# Patient Record
Sex: FEMALE | Race: WHITE | NOT HISPANIC OR LATINO | Employment: OTHER | URBAN - METROPOLITAN AREA
[De-identification: names, ages, dates, MRNs, and addresses within clinical notes are randomized per-mention and may not be internally consistent; named-entity substitution may affect disease eponyms.]

---

## 2018-03-06 ENCOUNTER — CONVERSION ENCOUNTER (OUTPATIENT)
Dept: GENERAL RADIOLOGY | Facility: HOSPITAL | Age: 53
End: 2018-03-06

## 2018-05-10 ENCOUNTER — OFFICE VISIT CONVERTED (OUTPATIENT)
Dept: GASTROENTEROLOGY | Facility: CLINIC | Age: 53
End: 2018-05-10
Attending: INTERNAL MEDICINE

## 2018-07-30 ENCOUNTER — OFFICE VISIT CONVERTED (OUTPATIENT)
Dept: FAMILY MEDICINE CLINIC | Facility: CLINIC | Age: 53
End: 2018-07-30
Attending: FAMILY MEDICINE

## 2018-08-06 ENCOUNTER — CONVERSION ENCOUNTER (OUTPATIENT)
Dept: SURGERY | Facility: CLINIC | Age: 53
End: 2018-08-06

## 2018-08-06 ENCOUNTER — OFFICE VISIT CONVERTED (OUTPATIENT)
Dept: UROLOGY | Facility: CLINIC | Age: 53
End: 2018-08-06
Attending: UROLOGY

## 2019-01-11 ENCOUNTER — OFFICE VISIT CONVERTED (OUTPATIENT)
Dept: FAMILY MEDICINE CLINIC | Facility: CLINIC | Age: 54
End: 2019-01-11
Attending: FAMILY MEDICINE

## 2019-01-11 ENCOUNTER — CONVERSION ENCOUNTER (OUTPATIENT)
Dept: FAMILY MEDICINE CLINIC | Facility: CLINIC | Age: 54
End: 2019-01-11

## 2019-02-01 ENCOUNTER — HOSPITAL ENCOUNTER (OUTPATIENT)
Dept: LAB | Facility: HOSPITAL | Age: 54
Discharge: HOME | End: 2019-02-01
Attending: FAMILY MEDICINE

## 2019-02-01 LAB
25(OH)D3 SERPL-MCNC: 22.8 NG/ML (ref 30–100)
ALBUMIN SERPL-MCNC: 4.1 G/DL (ref 3.5–5)
ALBUMIN/GLOB SERPL: 1.4 {RATIO} (ref 1.4–2.6)
ALP SERPL-CCNC: 100 U/L (ref 53–141)
ALT SERPL-CCNC: 19 U/L (ref 10–40)
ANION GAP SERPL CALC-SCNC: 14 MMOL/L (ref 8–19)
AST SERPL-CCNC: 20 U/L (ref 15–50)
BASOPHILS # BLD AUTO: 0.09 10*3/UL (ref 0–0.2)
BASOPHILS NFR BLD AUTO: 1.39 % (ref 0–3)
BILIRUB SERPL-MCNC: 0.6 MG/DL (ref 0.2–1.3)
BUN SERPL-MCNC: 15 MG/DL (ref 5–25)
BUN/CREAT SERPL: 19 {RATIO} (ref 6–20)
CALCIUM SERPL-MCNC: 9.2 MG/DL (ref 8.7–10.4)
CHLORIDE SERPL-SCNC: 102 MMOL/L (ref 99–111)
CHOLEST SERPL-MCNC: 161 MG/DL (ref 107–200)
CHOLEST/HDLC SERPL: 3.4 {RATIO} (ref 3–6)
CONV CO2: 30 MMOL/L (ref 22–32)
CONV TOTAL PROTEIN: 7.1 G/DL (ref 6.3–8.2)
CREAT UR-MCNC: 0.77 MG/DL (ref 0.5–0.9)
EOSINOPHIL # BLD AUTO: 0.49 10*3/UL (ref 0–0.7)
EOSINOPHIL # BLD AUTO: 7.38 % (ref 0–7)
ERYTHROCYTE [DISTWIDTH] IN BLOOD BY AUTOMATED COUNT: 11.6 % (ref 11.5–14.5)
FOLATE SERPL-MCNC: >20 NG/ML (ref 4.8–20)
GFR SERPLBLD BASED ON 1.73 SQ M-ARVRAT: >60 ML/MIN/{1.73_M2}
GLOBULIN UR ELPH-MCNC: 3 G/DL (ref 2–3.5)
GLUCOSE SERPL-MCNC: 82 MG/DL (ref 65–99)
HBA1C MFR BLD: 13 G/DL (ref 12–16)
HCT VFR BLD AUTO: 38.8 % (ref 37–47)
HDLC SERPL-MCNC: 48 MG/DL (ref 40–60)
LDLC SERPL CALC-MCNC: 92 MG/DL (ref 70–100)
LYMPHOCYTES # BLD AUTO: 2.94 10*3/UL (ref 1–5)
MCH RBC QN AUTO: 31.9 PG (ref 27–31)
MCHC RBC AUTO-ENTMCNC: 33.4 G/DL (ref 33–37)
MCV RBC AUTO: 95.7 FL (ref 81–99)
MONOCYTES # BLD AUTO: 0.63 10*3/UL (ref 0.2–1.2)
MONOCYTES NFR BLD AUTO: 9.55 % (ref 3–10)
NEUTROPHILS # BLD AUTO: 2.43 10*3/UL (ref 2–8)
NEUTROPHILS NFR BLD AUTO: 36.9 % (ref 30–85)
NRBC BLD AUTO-RTO: 0 % (ref 0–0.01)
OSMOLALITY SERPL CALC.SUM OF ELEC: 294 MOSM/KG (ref 273–304)
PLATELET # BLD AUTO: 260 10*3/UL (ref 130–400)
PMV BLD AUTO: 9.4 FL (ref 7.4–10.4)
POTASSIUM SERPL-SCNC: 3.9 MMOL/L (ref 3.5–5.3)
RBC # BLD AUTO: 4.06 10*6/UL (ref 4.2–5.4)
SODIUM SERPL-SCNC: 142 MMOL/L (ref 135–147)
TRIGL SERPL-MCNC: 104 MG/DL (ref 40–150)
TSH SERPL-ACNC: 2.76 M[IU]/L (ref 0.27–4.2)
VARIANT LYMPHS NFR BLD MANUAL: 44.8 % (ref 20–45)
VIT B12 SERPL-MCNC: 1192 PG/ML (ref 211–911)
VLDLC SERPL-MCNC: 21 MG/DL (ref 5–37)
WBC # BLD AUTO: 6.57 10*3/UL (ref 4.8–10.8)

## 2019-02-05 ENCOUNTER — OFFICE VISIT CONVERTED (OUTPATIENT)
Dept: FAMILY MEDICINE CLINIC | Facility: CLINIC | Age: 54
End: 2019-02-05
Attending: FAMILY MEDICINE

## 2019-03-08 ENCOUNTER — OFFICE VISIT CONVERTED (OUTPATIENT)
Dept: FAMILY MEDICINE CLINIC | Facility: CLINIC | Age: 54
End: 2019-03-08
Attending: FAMILY MEDICINE

## 2019-05-07 ENCOUNTER — HOSPITAL ENCOUNTER (OUTPATIENT)
Dept: GENERAL RADIOLOGY | Facility: HOSPITAL | Age: 54
Discharge: HOME | End: 2019-05-07
Attending: OBSTETRICS & GYNECOLOGY

## 2019-05-21 ENCOUNTER — HOSPITAL ENCOUNTER (OUTPATIENT)
Dept: GENERAL RADIOLOGY | Facility: HOSPITAL | Age: 54
Discharge: HOME | End: 2019-05-21
Attending: OBSTETRICS & GYNECOLOGY

## 2019-05-31 ENCOUNTER — HOSPITAL ENCOUNTER (OUTPATIENT)
Dept: MAMMOGRAPHY | Facility: HOSPITAL | Age: 54
Discharge: HOME | End: 2019-05-31
Attending: OBSTETRICS & GYNECOLOGY

## 2019-06-06 ENCOUNTER — HOSPITAL ENCOUNTER (OUTPATIENT)
Dept: ONCOLOGY | Facility: HOSPITAL | Age: 54
Discharge: HOME | End: 2019-06-06
Attending: INTERNAL MEDICINE

## 2019-06-06 ENCOUNTER — OFFICE VISIT CONVERTED (OUTPATIENT)
Dept: RADIATION ONCOLOGY | Facility: HOSPITAL | Age: 54
End: 2019-06-06
Attending: INTERNAL MEDICINE

## 2019-06-06 ENCOUNTER — OFFICE VISIT CONVERTED (OUTPATIENT)
Dept: OTHER | Facility: HOSPITAL | Age: 54
End: 2019-06-06
Attending: SURGERY

## 2019-06-10 ENCOUNTER — HOSPITAL ENCOUNTER (OUTPATIENT)
Dept: MRI IMAGING | Facility: HOSPITAL | Age: 54
Discharge: HOME | End: 2019-06-10
Attending: SURGERY

## 2019-06-12 ENCOUNTER — TELEPHONE (OUTPATIENT)
Dept: MAMMOGRAPHY | Facility: CLINIC | Age: 54
End: 2019-06-12

## 2019-06-26 ENCOUNTER — HOSPITAL ENCOUNTER (OUTPATIENT)
Dept: FAMILY MEDICINE CLINIC | Facility: CLINIC | Age: 54
Discharge: HOME | End: 2019-06-26
Attending: FAMILY MEDICINE

## 2019-06-27 ENCOUNTER — HOSPITAL ENCOUNTER (OUTPATIENT)
Dept: RADIATION ONCOLOGY | Facility: HOSPITAL | Age: 54
Setting detail: RECURRING SERIES
Discharge: HOME | End: 2019-06-30
Attending: RADIOLOGY

## 2019-06-28 ENCOUNTER — OFFICE VISIT (OUTPATIENT)
Dept: MAMMOGRAPHY | Facility: CLINIC | Age: 54
End: 2019-06-28

## 2019-06-28 ENCOUNTER — PREP FOR SURGERY (OUTPATIENT)
Dept: OTHER | Facility: HOSPITAL | Age: 54
End: 2019-06-28

## 2019-06-28 ENCOUNTER — DOCUMENTATION (OUTPATIENT)
Dept: OTHER | Facility: HOSPITAL | Age: 54
End: 2019-06-28

## 2019-06-28 VITALS
HEART RATE: 69 BPM | OXYGEN SATURATION: 97 % | DIASTOLIC BLOOD PRESSURE: 60 MMHG | HEIGHT: 65 IN | SYSTOLIC BLOOD PRESSURE: 108 MMHG | TEMPERATURE: 98.6 F | BODY MASS INDEX: 23.24 KG/M2 | WEIGHT: 139.5 LBS

## 2019-06-28 DIAGNOSIS — D05.11 DUCTAL CARCINOMA IN SITU (DCIS) OF RIGHT BREAST: Primary | ICD-10-CM

## 2019-06-28 LAB — BACTERIA UR CULT: NORMAL

## 2019-06-28 PROCEDURE — 99205 OFFICE O/P NEW HI 60 MIN: CPT | Performed by: SURGERY

## 2019-06-28 RX ORDER — MIRTAZAPINE 15 MG/1
30 TABLET, FILM COATED ORAL NIGHTLY
Refills: 1 | COMMUNITY
Start: 2019-04-06

## 2019-06-28 RX ORDER — ASPIRIN 81 MG/1
81 TABLET ORAL DAILY
COMMUNITY
End: 2019-07-17

## 2019-06-28 RX ORDER — CEFAZOLIN SODIUM 2 G/100ML
2 INJECTION, SOLUTION INTRAVENOUS ONCE
Status: CANCELLED | OUTPATIENT
Start: 2019-07-24 | End: 2019-06-28

## 2019-06-28 RX ORDER — DIAZEPAM 5 MG/1
10 TABLET ORAL ONCE
Status: CANCELLED | OUTPATIENT
Start: 2019-07-24 | End: 2019-06-28

## 2019-06-28 RX ORDER — ACETAMINOPHEN 500 MG
1000 TABLET ORAL ONCE
Status: CANCELLED | OUTPATIENT
Start: 2019-07-24 | End: 2019-06-28

## 2019-06-28 NOTE — PROGRESS NOTES
Chief Complaint: Jemma Inman is a 54 y.o.. female here today for Breast Cancer (Right DCIS)        History of Present Illness:  Patient presents with newly diagnosed DCIS. Right side.  She is a nice 54-year-old white female who had normal mammograms last year.  The mammograms this year suggested some pleomorphic calcifications that were clustered and located in the upper outer quadrant of the right breast.  These persisted on diagnostic imaging and a biopsy was recommended.  I have personally reviewed the imaging studies and agree that the calcifications are pleomorphic and concerning.  The biopsy has returned showing intermediate grade DCIS that is ER positive at 100%, UT positive at 12%, and the Ki-67 is 15%.  Review of the post biopsy films suggest that all the calcifications have been removed.  The patient has undergone an MRI of the breast and it reveals a 1.5 cm area of non-masslike enhancement in the right breast approximately 4 mm medial to the clip.  The enhancement appears to be adjacent to the pectoralis muscle but it does not appear to be invading it.  The patient has had a previous left breast biopsy that was benign.  Her family history is significant for 2 paternal aunts with breast cancer in their 60s.  Her mother had a primary brain cancer.  She took birth control pills for years but has not taken hormone replacement therapy.      Review of Systems:  Review of Systems   Constitutional: Positive for unexpected weight change.        30 lb. Loss unintentional   HENT:   Positive for hearing loss and tinnitus.    Gastrointestinal: Positive for diarrhea.   Endocrine: Positive for hot flashes.   Genitourinary: Positive for hematuria.    Musculoskeletal: Positive for back pain.   Skin: Positive for itching.        The patient denies any noticeable changes to the skin of the breast.    Psychiatric/Behavioral: The patient is nervous/anxious.         Past Medical and Surgical History:  Breast Biopsy  History:  Patient has had the following breast biopsies: Left-benign 2019 Right-malignant  Breast Cancer HIstory:  Patient does not have a past medical history of breast cancer.  Breast Operations, and year:  None  Social History     Tobacco Use   Smoking Status Former Smoker   • Last attempt to quit:    • Years since quittin.4     Obstetric History:  Patient is postmenopausal, entered menopause naturally at age: 46   Number of pregnancies:1  Number of live births: 1  Number of abortions or miscarriages: 0  Age of delivery of first child: 20  Patient did not breast feed.  Length of time taking birth control pills:30 years  Patient has never taken hormone replacement    Past Surgical History:   Procedure Laterality Date   • BREAST BIOPSY       Left-benign 2019 Right-malignant       Past Medical History:   Diagnosis Date   • IBS (irritable bowel syndrome)        Prior Hospitalizations, other than for surgery or childbirth, and year:  None    Social History:  Patient is .  Patient has one daughters.    Family History:  Family History   Problem Relation Age of Onset   • Depression Mother    • Brain cancer Mother    • Prostate cancer Father    • Breast cancer Paternal Aunt    • Prostate cancer Paternal Uncle    • Cancer Paternal Grandfather         eye   • Breast cancer Paternal Aunt    • Colon cancer Paternal Aunt    • Prostate cancer Paternal Uncle    • Throat cancer Paternal Uncle        Vital Signs:  Vitals:    19 1229   BP: 108/60   Pulse: 69   Temp: 98.6 °F (37 °C)   SpO2: 97%       Medications:    Current Outpatient Prescriptions:     Current Outpatient Medications:   •  aspirin 81 MG EC tablet, Take 81 mg by mouth Daily., Disp: , Rfl:   •  mirtazapine (REMERON) 15 MG tablet, Take 15 mg by mouth Daily., Disp: , Rfl: 1    Physical Examination:  General Appearance:   Patient is in no distress.  She is well kept and has an average build.   Psychiatric:  Patient with appropriate mood and  affect. Alert and oriented to self, time, and place.    Breast, RIGHT:  small sized, symmetric with the contralateral side.  Breast skin is without erythema, edema, rashes.  There are no visible abnormalities upon inspection during the arm-raising maneuver or with hands on hips in the sitting position. There is no nipple retraction, discharge or nipple/areolar skin changes.There are no masses palpable in the sitting or supine positions.  There is a small biopsy scar in the lateral aspect of the breast.    Breast, LEFT:  small sized, symmetric with the contralateral side.  Breast skin is without erythema, edema, rashes.  There are no visible abnormalities upon inspection during the arm-raising maneuver or with hands on hips in the sitting position. There is no nipple retraction, discharge or nipple/areolar skin changes.There are no masses palpable in the sitting or supine positions.  There is a small excisional biopsy scar in the 12 o'clock position of the breast.  There is a small amount of architectural distortion associated with this.    Lymphatic:  There is no axillary, cervical, infraclavicular, or supraclavicular adenopathy bilaterally.  Eyes:  Pupils are round and reactive to light.  Cardiovascular:  Heart rate and rhythm are regular.  Respiratory:  Lungs are clear bilaterally with no crackles or wheezes in any lung field.  Gastrointestinal:  Abdomen is soft, nondistended, and nontender.  There was no evidence of hepatosplenomegaly or abdominal mass.  There are no scars from previous surgery.    Musculoskeletal:  Good strength in all 4 extremities.   There is good range of motion in both shoulders.    Skin:  No new skin lesions or rashes on the skin excluding the breast (see breast exam above).    Cancer Staging  Primary Tumor: TIS  Regional Lymph Nodes:  N0  Distant Mets: M0  Clinical Stage Group: 0    Assessment:  1. Ductal carcinoma in situ (DCIS) of right breast          Plan:  She was accompanied today by  her .  The office visit lasted 1 hour and 5 minutes with 50 minutes spent in face-to-face consultation.    We began the conversation discussing her pathology report.  We talked about the origin of most of breast cancers from either the ducts or the lobules.  The difference between invasive and in situ disease was explained and the visual was drawn for her.  When invasion has occurred, the lymph nodes need to be evaluated.  When there is in situ disease the lymph nodes should be considered if the area of concern is widespread or it is high-grade.  We also discussed the significance of hormone receptors.  They often can give us some idea how the breast cancer will behave and potentially lead us to offer neoadjuvant chemotherapy.    Next we discussed the surgical options for DCIS which include breast conserving therapy versus a mastectomy.  With breast conserving therapy we are talking about a lumpectomy with margins and radiation treatment.  The radiation treatment is generally given to the entire breast for 6 weeks although they are doing some shorter courses nowadays..  The side effects consist of local skin change and potential injury to nearby structures such as the lung or the heart.  A mastectomy would involve removing the breast tissues with preservation of the pectoral muscles and evaluation of the lymph nodes if indicated.  The potential for reconstruction by either an implant or autologous tissue was discussed.  This could be performed on a delayed basis or immediately depending on a multitude of factors.  The survival rates for these 2 procedures are equivalent but there are incidences where one may be favored over the other.  There is also slight increased risk of local recurrence following lumpectomy than a mastectomy.  There are times when a lumpectomy is not possible due to the large tumor to breast ratio or the location of the tumor.  Previous radiation to the chest wall or collagen disorders  such as scleroderma would make radiation treatment and possible and therefore exclude breast conserving therapy as an option.    The patient prefers breast conserving surgery.  She understands that needle localization will be required.  She is also aware of the 15 to 20% risk of a positive margin requiring a return to surgery.  It is also possible that invasive cancer could be found requiring us to go back and perform a sentinel lymph node biopsy.  All of her questions have been answered and she wishes to proceed.    CPT coding:    Next Appointment:  No Follow-up on file.            EMR Dragon/transcription disclaimer:    Much of this encounter note is an electronic transcription/translocation of spoken language to printed text.  The electronic translation of spoken language may permit erroneous, or at times, nonsensical words or phrases to be inadvertently transcribed.  Although I have reviewed the note from such areas, some may still exist.

## 2019-06-28 NOTE — PROGRESS NOTES
Referral received from Dr. Myers's office. Met Jemma during her surgery consult. I introduced myself and navigational services. After the consult she is leaning toward having a lumpectomy. She is comfortable with this plan and has no questions or concerns following the consult. She has a good understanding of her pathology and treatment options presented to her by Dr. Myers.     We discussed integrative therapies and other services at [facility] including the opportunity to speak with our Oncology Dietitian or Oncology Social Worker. I gave her a navigation folder with the following information:    [facility] Cancer Support Network, Sharing Our Stories Breast Cancer Support Group, Cancer and Restorative Exercise (CARE), [facility] Exercise program, Together for Breast Cancer Survival, For Women Facing Breast Cancer, Road to Recovery, Bioimpedeance, [facility], Massage Therapy, Reiki Therapy, [employer] [place], Cancer Nutrition, Survivorship Clinic, Cancer and Career.     She verbalized appreciation for navigational services and she has my contact information and will call with any questions that arise.

## 2019-07-01 ENCOUNTER — TELEPHONE (OUTPATIENT)
Dept: MAMMOGRAPHY | Facility: CLINIC | Age: 54
End: 2019-07-01

## 2019-07-01 NOTE — TELEPHONE ENCOUNTER
The patient has fairly intermediate to low-grade DCIS.  She prefers to have her breast conserving surgery done in mid August and then go on her trip and come back to do the additional treatment.  I think that is reasonable to do and we will schedule her surgery around that.

## 2019-07-03 ENCOUNTER — TRANSCRIBE ORDERS (OUTPATIENT)
Dept: ADMINISTRATIVE | Facility: HOSPITAL | Age: 54
End: 2019-07-03

## 2019-07-03 DIAGNOSIS — C50.919 MALIGNANT NEOPLASM OF FEMALE BREAST, UNSPECIFIED ESTROGEN RECEPTOR STATUS, UNSPECIFIED LATERALITY, UNSPECIFIED SITE OF BREAST (HCC): Primary | ICD-10-CM

## 2019-07-03 PROBLEM — D05.11 DUCTAL CARCINOMA IN SITU (DCIS) OF RIGHT BREAST: Status: ACTIVE | Noted: 2019-07-03

## 2019-07-15 DIAGNOSIS — D05.11 DUCTAL CARCINOMA IN SITU (DCIS) OF RIGHT BREAST: Primary | ICD-10-CM

## 2019-07-17 ENCOUNTER — APPOINTMENT (OUTPATIENT)
Dept: PREADMISSION TESTING | Facility: HOSPITAL | Age: 54
End: 2019-07-17

## 2019-07-17 VITALS
RESPIRATION RATE: 18 BRPM | DIASTOLIC BLOOD PRESSURE: 62 MMHG | BODY MASS INDEX: 22.99 KG/M2 | HEIGHT: 65 IN | WEIGHT: 138 LBS | TEMPERATURE: 97 F | OXYGEN SATURATION: 96 % | SYSTOLIC BLOOD PRESSURE: 116 MMHG | HEART RATE: 81 BPM

## 2019-07-17 LAB
ANION GAP SERPL CALCULATED.3IONS-SCNC: 9.3 MMOL/L (ref 5–15)
BUN BLD-MCNC: 16 MG/DL (ref 6–20)
BUN/CREAT SERPL: 20.3 (ref 7–25)
CALCIUM SPEC-SCNC: 9.1 MG/DL (ref 8.6–10.5)
CHLORIDE SERPL-SCNC: 104 MMOL/L (ref 98–107)
CO2 SERPL-SCNC: 27.7 MMOL/L (ref 22–29)
CREAT BLD-MCNC: 0.79 MG/DL (ref 0.57–1)
DEPRECATED RDW RBC AUTO: 44.7 FL (ref 37–54)
ERYTHROCYTE [DISTWIDTH] IN BLOOD BY AUTOMATED COUNT: 12.5 % (ref 12.3–15.4)
GFR SERPL CREATININE-BSD FRML MDRD: 75 ML/MIN/1.73
GLUCOSE BLD-MCNC: 109 MG/DL (ref 65–99)
HCT VFR BLD AUTO: 37.1 % (ref 34–46.6)
HGB BLD-MCNC: 11.8 G/DL (ref 12–15.9)
MCH RBC QN AUTO: 31 PG (ref 26.6–33)
MCHC RBC AUTO-ENTMCNC: 31.8 G/DL (ref 31.5–35.7)
MCV RBC AUTO: 97.4 FL (ref 79–97)
PLATELET # BLD AUTO: 217 10*3/MM3 (ref 140–450)
PMV BLD AUTO: 11.4 FL (ref 6–12)
POTASSIUM BLD-SCNC: 4.2 MMOL/L (ref 3.5–5.2)
RBC # BLD AUTO: 3.81 10*6/MM3 (ref 3.77–5.28)
SODIUM BLD-SCNC: 141 MMOL/L (ref 136–145)
WBC NRBC COR # BLD: 6.34 10*3/MM3 (ref 3.4–10.8)

## 2019-07-17 PROCEDURE — 93010 ELECTROCARDIOGRAM REPORT: CPT | Performed by: INTERNAL MEDICINE

## 2019-07-17 PROCEDURE — 80048 BASIC METABOLIC PNL TOTAL CA: CPT | Performed by: SURGERY

## 2019-07-17 PROCEDURE — 93005 ELECTROCARDIOGRAM TRACING: CPT

## 2019-07-17 PROCEDURE — 85027 COMPLETE CBC AUTOMATED: CPT | Performed by: SURGERY

## 2019-07-17 PROCEDURE — 36415 COLL VENOUS BLD VENIPUNCTURE: CPT

## 2019-07-17 NOTE — DISCHARGE INSTRUCTIONS
Take the following medications the morning of surgery with a small sip of water: NONE    ARRIVE AT 7AM    General Instructions:  • Do not eat solid food after midnight the night before surgery.  • You may drink clear liquids day of surgery but must stop at least one hour before your hospital arrival time.  • It is beneficial for you to have a clear drink that contains carbohydrates the day of surgery.  We suggest a 12 to 20 ounce bottle of Gatorade or Powerade for non-diabetic patients or a 12 to 20 ounce bottle of G2 or Powerade Zero for diabetic patients. (Pediatric patients, are not advised to drink a 12 to 20 ounce carbohydrate drink)    Clear liquids are liquids you can see through.  Nothing red in color.     Plain water                               Sports drinks  Sodas                                   Gelatin (Jell-O)  Fruit juices without pulp such as white grape juice and apple juice  Popsicles that contain no fruit or yogurt  Tea or coffee (no cream or milk added)  Gatorade / Powerade  G2 / Powerade Zero    • Infants may have breast milk up to four hours before surgery.  • Infants drinking formula may drink formula up to six hours before surgery.   • Patients who avoid smoking, chewing tobacco and alcohol for 4 weeks prior to surgery have a reduced risk of post-operative complications.  Quit smoking as many days before surgery as you can.  • Do not smoke, use chewing tobacco or drink alcohol the day of surgery.   • If applicable bring your C-PAP/ BI-PAP machine.  • Bring any papers given to you in the doctor’s office.  • Wear clean comfortable clothes and socks.  • Do not wear contact lenses, false eyelashes or make-up.  Bring a case for your glasses.   • Bring crutches or walker if applicable.  • Remove all piercings.  Leave jewelry and any other valuables at home.  • Hair extensions with metal clips must be removed prior to surgery.  • The Pre-Admission Testing nurse will instruct you to bring  medications if unable to obtain an accurate list in Pre-Admission Testing.            Preventing a Surgical Site Infection:  • For 2 to 3 days before surgery, avoid shaving with a razor because the razor can irritate skin and make it easier to develop an infection.    • Any areas of open skin can increase the risk of a post-operative wound infection by allowing bacteria to enter and travel throughout the body.  Notify your surgeon if you have any skin wounds / rashes even if it is not near the expected surgical site.  The area will need assessed to determine if surgery should be delayed until it is healed.  • The night prior to surgery sleep in a clean bed with clean clothing.  Do not allow pets to sleep with you.  • Shower on the morning of surgery using a fresh bar of anti-bacterial soap (such as Dial) and clean washcloth.  Dry with a clean towel and dress in clean clothing.  • Ask your surgeon if you will be receiving antibiotics prior to surgery.  • Make sure you, your family, and all healthcare providers clean their hands with soap and water or an alcohol based hand  before caring for you or your wound.    Day of surgery:  Upon arrival, a Pre-op nurse and Anesthesiologist will review your health history, obtain vital signs, and answer questions you may have.  The only belongings needed at this time will be a list of your home medications and if applicable your C-PAP/BI-PAP machine.  If you are staying overnight your family can leave the rest of your belongings in the car and bring them to your room later.  A Pre-op nurse will start an IV and you may receive medication in preparation for surgery, including something to help you relax.  Your family will be able to see you in the Pre-op area.  While you are in surgery your family should notify the waiting room  if they leave the waiting room area and provide a contact phone number.    Please be aware that surgery does come with discomfort.  We  want to make every effort to control your discomfort so please discuss any uncontrolled symptoms with your nurse.   Your doctor will most likely have prescribed pain medications.      If you are going home after surgery you will receive individualized written care instructions before being discharged.  A responsible adult must drive you to and from the hospital on the day of your surgery and stay with you for 24 hours.    If you are staying overnight following surgery, you will be transported to your hospital room following the recovery period.  [facility] has all private rooms.    You have received a list of surgical assistants for your reference.  If you have any questions please call Pre-Admission Testing at [phone].  Deductibles and co-payments are collected on the day of service. Please be prepared to pay the required co-pay, deductible or deposit on the day of service as defined by your plan.  2% CHLORAHEXIDINE GLUCONATE* CLOTH  Preparing or “prepping” skin before surgery can reduce the risk of infection at the surgical site. To make the process easier, [facility] has chosen disposable cloths moistened with a rinse-free, 2% Chlorhexidine Gluconate (CHG) antiseptic solution. The steps below outline the prepping process and should be carefully followed.        Use the prep cloth on the area that is circled in the diagram             Directions Night before Surgery  1) Shower using a fresh bar of anti-bacterial soap (such as Dial) and clean washcloth.  Use a clean towel to completely dry your skin.  2) Do not use any lotions, oils or creams on your skin.  3) Open the package and remove 1 cloth, wipe your skin for 30 seconds in a circular motion.  Allow to dry for 3 minutes.  4) Repeat #3 with second cloth.  5) Do not touch your eyes, ears, or mouth with the prep cloth.  6) Allow the wet prep solution to air dry.  7) Discard the prep cloth and wash your hands with soap and water.    8) Dress in clean bed clothes and sleep on fresh clean bed sheets.   9) You may experience some temporary itching after the prep.    Directions Day of Surgery  1) Repeat steps 1,2,3,4,5,6,7, and 9.   2) Dress in clean clothes before coming to the hospital.

## 2019-07-24 ENCOUNTER — HOSPITAL ENCOUNTER (OUTPATIENT)
Facility: HOSPITAL | Age: 54
Setting detail: HOSPITAL OUTPATIENT SURGERY
Discharge: HOME | End: 2019-07-24
Attending: SURGERY | Admitting: SURGERY

## 2019-07-24 ENCOUNTER — ANESTHESIA (OUTPATIENT)
Dept: PERIOP | Facility: HOSPITAL | Age: 54
End: 2019-07-24

## 2019-07-24 ENCOUNTER — ANESTHESIA EVENT (OUTPATIENT)
Dept: PERIOP | Facility: HOSPITAL | Age: 54
End: 2019-07-24

## 2019-07-24 ENCOUNTER — APPOINTMENT (OUTPATIENT)
Dept: GENERAL RADIOLOGY | Facility: HOSPITAL | Age: 54
End: 2019-07-24

## 2019-07-24 ENCOUNTER — HOSPITAL ENCOUNTER (OUTPATIENT)
Dept: MAMMOGRAPHY | Facility: HOSPITAL | Age: 54
Discharge: HOME | End: 2019-07-24

## 2019-07-24 VITALS
BODY MASS INDEX: 22.71 KG/M2 | SYSTOLIC BLOOD PRESSURE: 117 MMHG | DIASTOLIC BLOOD PRESSURE: 76 MMHG | TEMPERATURE: 97.9 F | HEART RATE: 74 BPM | RESPIRATION RATE: 16 BRPM | WEIGHT: 136.3 LBS | HEIGHT: 65 IN | OXYGEN SATURATION: 100 %

## 2019-07-24 DIAGNOSIS — D05.11 DUCTAL CARCINOMA IN SITU (DCIS) OF RIGHT BREAST: ICD-10-CM

## 2019-07-24 DIAGNOSIS — C50.919 MALIGNANT NEOPLASM OF FEMALE BREAST, UNSPECIFIED ESTROGEN RECEPTOR STATUS, UNSPECIFIED LATERALITY, UNSPECIFIED SITE OF BREAST (HCC): ICD-10-CM

## 2019-07-24 PROCEDURE — 25010000003 CEFAZOLIN IN DEXTROSE 2-4 GM/100ML-% SOLUTION: Performed by: SURGERY

## 2019-07-24 PROCEDURE — 25010000002 PROPOFOL 10 MG/ML EMULSION: Performed by: NURSE ANESTHETIST, CERTIFIED REGISTERED

## 2019-07-24 PROCEDURE — 25010000003 LIDOCAINE 1 % SOLUTION: Performed by: SURGERY

## 2019-07-24 PROCEDURE — 88341 IMHCHEM/IMCYTCHM EA ADD ANTB: CPT | Performed by: SURGERY

## 2019-07-24 PROCEDURE — 88360 TUMOR IMMUNOHISTOCHEM/MANUAL: CPT | Performed by: SURGERY

## 2019-07-24 PROCEDURE — 19301 PARTIAL MASTECTOMY: CPT | Performed by: SURGERY

## 2019-07-24 PROCEDURE — 25010000002 FENTANYL CITRATE (PF) 100 MCG/2ML SOLUTION: Performed by: NURSE ANESTHETIST, CERTIFIED REGISTERED

## 2019-07-24 PROCEDURE — 25010000002 ONDANSETRON PER 1 MG: Performed by: NURSE ANESTHETIST, CERTIFIED REGISTERED

## 2019-07-24 PROCEDURE — 88307 TISSUE EXAM BY PATHOLOGIST: CPT | Performed by: SURGERY

## 2019-07-24 PROCEDURE — 88342 IMHCHEM/IMCYTCHM 1ST ANTB: CPT | Performed by: SURGERY

## 2019-07-24 PROCEDURE — 76098 X-RAY EXAM SURGICAL SPECIMEN: CPT

## 2019-07-24 PROCEDURE — 25010000002 DEXAMETHASONE PER 1 MG: Performed by: NURSE ANESTHETIST, CERTIFIED REGISTERED

## 2019-07-24 RX ORDER — LABETALOL HYDROCHLORIDE 5 MG/ML
5 INJECTION, SOLUTION INTRAVENOUS
Status: DISCONTINUED | OUTPATIENT
Start: 2019-07-24 | End: 2019-07-24 | Stop reason: HOSPADM

## 2019-07-24 RX ORDER — ASPIRIN 81 MG/1
81 TABLET, CHEWABLE ORAL DAILY
COMMUNITY

## 2019-07-24 RX ORDER — ACETAMINOPHEN 325 MG/1
650 TABLET ORAL ONCE AS NEEDED
Status: DISCONTINUED | OUTPATIENT
Start: 2019-07-24 | End: 2019-07-24 | Stop reason: HOSPADM

## 2019-07-24 RX ORDER — PROMETHAZINE HYDROCHLORIDE 25 MG/ML
6.25 INJECTION, SOLUTION INTRAMUSCULAR; INTRAVENOUS
Status: DISCONTINUED | OUTPATIENT
Start: 2019-07-24 | End: 2019-07-24 | Stop reason: HOSPADM

## 2019-07-24 RX ORDER — FENTANYL CITRATE 50 UG/ML
50 INJECTION, SOLUTION INTRAMUSCULAR; INTRAVENOUS
Status: DISCONTINUED | OUTPATIENT
Start: 2019-07-24 | End: 2019-07-24 | Stop reason: HOSPADM

## 2019-07-24 RX ORDER — SODIUM CHLORIDE 0.9 % (FLUSH) 0.9 %
1-10 SYRINGE (ML) INJECTION AS NEEDED
Status: DISCONTINUED | OUTPATIENT
Start: 2019-07-24 | End: 2019-07-24 | Stop reason: HOSPADM

## 2019-07-24 RX ORDER — ONDANSETRON 2 MG/ML
4 INJECTION INTRAMUSCULAR; INTRAVENOUS ONCE AS NEEDED
Status: DISCONTINUED | OUTPATIENT
Start: 2019-07-24 | End: 2019-07-24 | Stop reason: HOSPADM

## 2019-07-24 RX ORDER — LIDOCAINE HYDROCHLORIDE 20 MG/ML
INJECTION, SOLUTION INFILTRATION; PERINEURAL AS NEEDED
Status: DISCONTINUED | OUTPATIENT
Start: 2019-07-24 | End: 2019-07-24 | Stop reason: SURG

## 2019-07-24 RX ORDER — HYDRALAZINE HYDROCHLORIDE 20 MG/ML
5 INJECTION INTRAMUSCULAR; INTRAVENOUS
Status: DISCONTINUED | OUTPATIENT
Start: 2019-07-24 | End: 2019-07-24 | Stop reason: HOSPADM

## 2019-07-24 RX ORDER — DEXAMETHASONE SODIUM PHOSPHATE 10 MG/ML
INJECTION INTRAMUSCULAR; INTRAVENOUS AS NEEDED
Status: DISCONTINUED | OUTPATIENT
Start: 2019-07-24 | End: 2019-07-24 | Stop reason: SURG

## 2019-07-24 RX ORDER — PROMETHAZINE HYDROCHLORIDE 25 MG/1
25 SUPPOSITORY RECTAL ONCE AS NEEDED
Status: DISCONTINUED | OUTPATIENT
Start: 2019-07-24 | End: 2019-07-24 | Stop reason: HOSPADM

## 2019-07-24 RX ORDER — HYDROCODONE BITARTRATE AND ACETAMINOPHEN 7.5; 325 MG/1; MG/1
1 TABLET ORAL ONCE AS NEEDED
Status: DISCONTINUED | OUTPATIENT
Start: 2019-07-24 | End: 2019-07-24 | Stop reason: HOSPADM

## 2019-07-24 RX ORDER — CEFAZOLIN SODIUM 2 G/100ML
2 INJECTION, SOLUTION INTRAVENOUS ONCE
Status: COMPLETED | OUTPATIENT
Start: 2019-07-24 | End: 2019-07-24

## 2019-07-24 RX ORDER — FLUMAZENIL 0.1 MG/ML
0.2 INJECTION INTRAVENOUS AS NEEDED
Status: DISCONTINUED | OUTPATIENT
Start: 2019-07-24 | End: 2019-07-24 | Stop reason: HOSPADM

## 2019-07-24 RX ORDER — FENTANYL CITRATE 50 UG/ML
INJECTION, SOLUTION INTRAMUSCULAR; INTRAVENOUS AS NEEDED
Status: DISCONTINUED | OUTPATIENT
Start: 2019-07-24 | End: 2019-07-24 | Stop reason: SURG

## 2019-07-24 RX ORDER — HYDROMORPHONE HYDROCHLORIDE 1 MG/ML
0.5 INJECTION, SOLUTION INTRAMUSCULAR; INTRAVENOUS; SUBCUTANEOUS
Status: DISCONTINUED | OUTPATIENT
Start: 2019-07-24 | End: 2019-07-24 | Stop reason: HOSPADM

## 2019-07-24 RX ORDER — MIDAZOLAM HYDROCHLORIDE 1 MG/ML
1 INJECTION INTRAMUSCULAR; INTRAVENOUS
Status: DISCONTINUED | OUTPATIENT
Start: 2019-07-24 | End: 2019-07-24 | Stop reason: HOSPADM

## 2019-07-24 RX ORDER — LIDOCAINE HYDROCHLORIDE 10 MG/ML
3 INJECTION, SOLUTION INFILTRATION; PERINEURAL ONCE
Status: COMPLETED | OUTPATIENT
Start: 2019-07-24 | End: 2019-07-24

## 2019-07-24 RX ORDER — ACETAMINOPHEN 500 MG
1000 TABLET ORAL ONCE
Status: COMPLETED | OUTPATIENT
Start: 2019-07-24 | End: 2019-07-24

## 2019-07-24 RX ORDER — PROPOFOL 10 MG/ML
VIAL (ML) INTRAVENOUS AS NEEDED
Status: DISCONTINUED | OUTPATIENT
Start: 2019-07-24 | End: 2019-07-24 | Stop reason: SURG

## 2019-07-24 RX ORDER — HYDROCODONE BITARTRATE AND ACETAMINOPHEN 5; 325 MG/1; MG/1
1-2 TABLET ORAL EVERY 4 HOURS PRN
Qty: 10 TABLET | Refills: 0 | Status: SHIPPED | OUTPATIENT
Start: 2019-07-24 | End: 2019-08-09

## 2019-07-24 RX ORDER — DIAZEPAM 5 MG/1
10 TABLET ORAL ONCE
Status: COMPLETED | OUTPATIENT
Start: 2019-07-24 | End: 2019-07-24

## 2019-07-24 RX ORDER — PROMETHAZINE HYDROCHLORIDE 25 MG/ML
12.5 INJECTION, SOLUTION INTRAMUSCULAR; INTRAVENOUS ONCE AS NEEDED
Status: DISCONTINUED | OUTPATIENT
Start: 2019-07-24 | End: 2019-07-24 | Stop reason: HOSPADM

## 2019-07-24 RX ORDER — ONDANSETRON 2 MG/ML
INJECTION INTRAMUSCULAR; INTRAVENOUS AS NEEDED
Status: DISCONTINUED | OUTPATIENT
Start: 2019-07-24 | End: 2019-07-24 | Stop reason: SURG

## 2019-07-24 RX ORDER — MAGNESIUM HYDROXIDE 1200 MG/15ML
LIQUID ORAL AS NEEDED
Status: DISCONTINUED | OUTPATIENT
Start: 2019-07-24 | End: 2019-07-24 | Stop reason: HOSPADM

## 2019-07-24 RX ORDER — NALOXONE HCL 0.4 MG/ML
0.2 VIAL (ML) INJECTION AS NEEDED
Status: DISCONTINUED | OUTPATIENT
Start: 2019-07-24 | End: 2019-07-24 | Stop reason: HOSPADM

## 2019-07-24 RX ORDER — LIDOCAINE HYDROCHLORIDE 10 MG/ML
0.5 INJECTION, SOLUTION EPIDURAL; INFILTRATION; INTRACAUDAL; PERINEURAL ONCE AS NEEDED
Status: DISCONTINUED | OUTPATIENT
Start: 2019-07-24 | End: 2019-07-24 | Stop reason: HOSPADM

## 2019-07-24 RX ORDER — MIDAZOLAM HYDROCHLORIDE 1 MG/ML
2 INJECTION INTRAMUSCULAR; INTRAVENOUS
Status: DISCONTINUED | OUTPATIENT
Start: 2019-07-24 | End: 2019-07-24 | Stop reason: HOSPADM

## 2019-07-24 RX ORDER — PROMETHAZINE HYDROCHLORIDE 25 MG/1
25 TABLET ORAL ONCE AS NEEDED
Status: DISCONTINUED | OUTPATIENT
Start: 2019-07-24 | End: 2019-07-24 | Stop reason: HOSPADM

## 2019-07-24 RX ORDER — BUPIVACAINE HYDROCHLORIDE 2.5 MG/ML
INJECTION, SOLUTION INFILTRATION; PERINEURAL AS NEEDED
Status: DISCONTINUED | OUTPATIENT
Start: 2019-07-24 | End: 2019-07-24 | Stop reason: HOSPADM

## 2019-07-24 RX ORDER — FAMOTIDINE 10 MG/ML
20 INJECTION, SOLUTION INTRAVENOUS ONCE
Status: COMPLETED | OUTPATIENT
Start: 2019-07-24 | End: 2019-07-24

## 2019-07-24 RX ORDER — SODIUM CHLORIDE, SODIUM LACTATE, POTASSIUM CHLORIDE, CALCIUM CHLORIDE 600; 310; 30; 20 MG/100ML; MG/100ML; MG/100ML; MG/100ML
9 INJECTION, SOLUTION INTRAVENOUS CONTINUOUS
Status: DISCONTINUED | OUTPATIENT
Start: 2019-07-24 | End: 2019-07-24 | Stop reason: HOSPADM

## 2019-07-24 RX ADMIN — DIAZEPAM 10 MG: 5 TABLET ORAL at 08:01

## 2019-07-24 RX ADMIN — ACETAMINOPHEN 1000 MG: 500 TABLET, FILM COATED ORAL at 08:01

## 2019-07-24 RX ADMIN — FAMOTIDINE 20 MG: 10 INJECTION INTRAVENOUS at 08:34

## 2019-07-24 RX ADMIN — FENTANYL CITRATE 50 MCG: 50 INJECTION INTRAMUSCULAR; INTRAVENOUS at 11:55

## 2019-07-24 RX ADMIN — ONDANSETRON 4 MG: 2 INJECTION INTRAMUSCULAR; INTRAVENOUS at 12:50

## 2019-07-24 RX ADMIN — LIDOCAINE HYDROCHLORIDE 3 ML: 10 INJECTION, SOLUTION INFILTRATION; PERINEURAL at 10:00

## 2019-07-24 RX ADMIN — PROPOFOL 200 MG: 10 INJECTION, EMULSION INTRAVENOUS at 11:55

## 2019-07-24 RX ADMIN — LIDOCAINE HYDROCHLORIDE 100 MG: 20 INJECTION, SOLUTION INFILTRATION; PERINEURAL at 11:55

## 2019-07-24 RX ADMIN — DEXAMETHASONE SODIUM PHOSPHATE 8 MG: 10 INJECTION INTRAMUSCULAR; INTRAVENOUS at 12:04

## 2019-07-24 RX ADMIN — SODIUM CHLORIDE, POTASSIUM CHLORIDE, SODIUM LACTATE AND CALCIUM CHLORIDE 9 ML/HR: 600; 310; 30; 20 INJECTION, SOLUTION INTRAVENOUS at 08:03

## 2019-07-24 RX ADMIN — CEFAZOLIN SODIUM 2 G: 2 INJECTION, SOLUTION INTRAVENOUS at 12:00

## 2019-07-24 NOTE — OP NOTE
Needle Localization Breast lumpectomy for DCIS Procedure Note    Name: Jemma Inman  Age: 54 y.o.  Sex: female  :  1965  MRN: [id number]      Pre-operative Diagnosis:rightDCIS    Post-operative Diagnosis: Same    Procedure:right Needle Localization Breast lumpectomy    Surgeon: Ervin Myers MD.,  FACS    Assistants: None    Anesthesia: General    Indications: This patient recently was diagnosed with right DCIS after presenting with an abnormal mammogram.      Procedure Details   The patient was seen in the Holding Room. The risks, benefits, complications, treatment options, and expected outcomes were discussed with the patient. The possibilities of reaction to medication, pulmonary aspiration, bleeding, infection, the need for additional procedures, failure to diagnose a condition, and creating a complication requiring transfusion or operation were discussed with the patient. The patient concurred with the proposed plan, giving informed consent.  The site of surgery properly noted/marked.    The patient underwent preoperative guidewire localization of a mammographic abnormality in the lower outer aspect of the right breast.       The patient was then taken to Operating Room; identified patient as Jemma Inman  and the procedure verified as rightNeedle Localization  Breast lumpectomy  A Time Out was held and the above information confirmed.       The breast was prepped and draped in standard fashion. Marcaine  0.50% with epinephrine was used to anesthetize the skin at the site of the guidewire placement.   A curvilinear incision was created on the breast near the localization site.  Dissection was carried down to the localized area which was completely excised.  A specimen radiograph confirmed inclusion of the preoperatively identified mammographic lesion/ clip.  Additional shave margins were obtained from areas thought to be close.  This included all 6 margins.. Hemostasis was achieved with  cautery.  Closure was performed  with interrupted 3-0 Vicryl sutures for the deeper layers and a 4-0 Vicryl subcuticular closure.      Steri-Strips were applied. At the end of the operation, all sponge, instrument, and needle counts were correct.    Findings:  There were no unexpected findings.  Estimated Blood Loss:  Minimal           Drains: none          Specimens:   ID Type Source Tests Collected by Time   A : Right breast lumpectomy; long stitch marks lateral, short stitch marks superior, double stitch marks anterior Tissue Breast, Right TISSUE PATHOLOGY EXAM Ervin Myers MD 7/24/2019 1223   B : Additional lateral margin; stitch marks new margin Tissue Breast, Right TISSUE PATHOLOGY EXAM Ervin Myers MD 7/24/2019 1227   C : Additional posterior margin; stitch marks new margin Tissue Breast, Right TISSUE PATHOLOGY EXAM Ervin Myers MD 7/24/2019 1230   D : Additional inferior margin; stitch marks new margin Tissue Breast, Right TISSUE PATHOLOGY EXAM Ervin Myers MD 7/24/2019 1231   E : Additional medial margin; stitch marks new margin Tissue Breast, Right TISSUE PATHOLOGY EXAM Ervin Myers MD 7/24/2019 1232   F : Additional superior margin; stitch marks new margin Tissue Breast, Right TISSUE PATHOLOGY EXAM Ervin Myers MD 7/24/2019 1235   G : Additional anterior margin; stitch marks new margin Tissue Breast, Right TISSUE PATHOLOGY EXAM Ervin Myers MD 7/24/2019 1237       Complications:  None; patient tolerated the procedure well.           Condition: stable

## 2019-07-24 NOTE — ANESTHESIA PROCEDURE NOTES
Airway  Urgency: elective    Airway not difficult    General Information and Staff    Patient location during procedure: OR  Anesthesiologist: Yves Marti MD  CRNA: Reyna Simmons CRNA    Indications and Patient Condition  Indications for airway management: airway protection    Preoxygenated: yes (Pt pre-O2 with 100% O2)  Mask difficulty assessment: 0 - not attempted    Final Airway Details  Final airway type: supraglottic airway      Successful airway: classic  Size 4    Number of attempts at approach: 1    Additional Comments  ATOLMA x1. No change in dentition. + ETCO2. Airway seal pressure <20cm H2O.

## 2019-07-24 NOTE — ANESTHESIA PREPROCEDURE EVALUATION
Anesthesia Evaluation     Patient summary reviewed and Nursing notes reviewed   history of anesthetic complications: PONV  NPO Solid Status: > 8 hours  NPO Liquid Status: > 2 hours           Airway   Mallampati: II  TM distance: >3 FB  Neck ROM: full  Anterior and Possible difficult intubation  Dental          Pulmonary - normal exam   Cardiovascular - normal exam  Exercise tolerance: excellent (>7 METS)    ECG reviewed    (-) angina, PND, GARAY      Neuro/Psych  (+) psychiatric history Anxiety,     GI/Hepatic/Renal/Endo    (-) GERD    Musculoskeletal     Abdominal  - normal exam   Substance History      OB/GYN          Other      history of cancer    ROS/Med Hx Other: Highly anxious  Pt refused scop patch      Phys Exam Other: Prominent incisors              Anesthesia Plan    ASA 2     general   (PONV prophylaxis)  intravenous induction   Anesthetic plan, all risks, benefits, and alternatives have been provided, discussed and informed consent has been obtained with: patient.

## 2019-07-24 NOTE — ANESTHESIA POSTPROCEDURE EVALUATION
"Patient: Jemma Inman    Procedure Summary     Date:  07/24/19 Room / Location:  [facility] OR  / [facility] MAIN OR    Anesthesia Start:  1152 Anesthesia Stop:  1309    Procedure:  RIGHT BREAST LUMPECTOMY WITH NEEDLE LOCALIZATION (Right Breast) Diagnosis:       Ductal carcinoma in situ (DCIS) of right breast      (Ductal carcinoma in situ (DCIS) of right breast [D05.11])    Surgeon:  Ervin Myers MD Provider:  Yves Marti MD    Anesthesia Type:  general ASA Status:  2          Anesthesia Type: general  Last vitals  BP   111/74 (07/24/19 1410)   Temp   36.3 °C (97.4 °F) (07/24/19 1306)   Pulse   73 (07/24/19 1410)   Resp   16 (07/24/19 1410)     SpO2   96 % (07/24/19 1410)     Post Anesthesia Care and Evaluation    Patient location during evaluation: bedside  Patient participation: complete - patient participated  Level of consciousness: awake  Pain management: adequate  Airway patency: patent  Anesthetic complications: No anesthetic complications    Cardiovascular status: acceptable  Respiratory status: acceptable  Hydration status: acceptable    Comments: */74   Pulse 73   Temp 36.3 °C (97.4 °F) (Oral)   Resp 16   Ht 165.1 cm (65\")   Wt 61.8 kg (136 lb 4.8 oz)   SpO2 96%   BMI 22.68 kg/m²         "

## 2019-07-24 NOTE — PERIOPERATIVE NURSING NOTE
Dr. Myers notified of bilat armpit rash.  Pt states has been present for one month.  Sites dry and intact. Ok for surgery today per Md.

## 2019-07-26 ENCOUNTER — TELEPHONE (OUTPATIENT)
Dept: MAMMOGRAPHY | Facility: CLINIC | Age: 54
End: 2019-07-26

## 2019-07-26 LAB
CYTO UR: NORMAL
LAB AP CASE REPORT: NORMAL
LAB AP DIAGNOSIS COMMENT: NORMAL
LAB AP SYNOPTIC CHECKLIST: NORMAL
PATH REPORT.FINAL DX SPEC: NORMAL
PATH REPORT.GROSS SPEC: NORMAL

## 2019-07-26 NOTE — TELEPHONE ENCOUNTER
I told her the DCIS covered about 15 mm.  6 additional margins were taken and in the superior margin they found an additional focus of DCIS and that margin is free by 1.5 mm.  I do not think that we need to go and re-excise this margin anymore.

## 2019-08-06 ENCOUNTER — OFFICE VISIT (OUTPATIENT)
Dept: MAMMOGRAPHY | Facility: CLINIC | Age: 54
End: 2019-08-06

## 2019-08-06 VITALS — SYSTOLIC BLOOD PRESSURE: 118 MMHG | DIASTOLIC BLOOD PRESSURE: 60 MMHG

## 2019-08-06 DIAGNOSIS — D05.11 DUCTAL CARCINOMA IN SITU (DCIS) OF RIGHT BREAST: Primary | ICD-10-CM

## 2019-08-06 PROCEDURE — 99024 POSTOP FOLLOW-UP VISIT: CPT | Performed by: SURGERY

## 2019-08-06 NOTE — PROGRESS NOTES
Chief Complaint: Jemma Inman is a  54 y.o. female, initially referred by No ref. provider found , who is here today for a postoperative visit.    History of Present Illness:  In the interim,Jemma Inman has had the following procedure and resultant pathology report: She is undergone a right breast lumpectomy.  The path report showed grade 2 DCIS with no evidence of invasion.  With the addition of multiple margins we were able to achieve nice wide margins around the tumor.    She has noted no redness, warmth,drainage, swelling at the incision site. Denies fever or chills.      Current Outpatient Medications:   •  aspirin 81 MG chewable tablet, Chew 81 mg Daily., Disp: , Rfl:   •  Chlorhexidine Gluconate (HIBICLENS EX), Apply 1 application topically Take As Directed., Disp: , Rfl:   •  HYDROcodone-acetaminophen (NORCO) 5-325 MG per tablet, Take 1-2 tablets by mouth Every 4 (Four) Hours As Needed (Pain)., Disp: 10 tablet, Rfl: 0  •  mirtazapine (REMERON) 15 MG tablet, Take 15 mg by mouth Every Night., Disp: , Rfl: 1  Physical examination  Right breast- the incision in the lateral aspect of the right breast is healing nicely.  There is some firmness as expected but no signs of infection.  Assessment:  DCIS right breast status post lumpectomy.  She is healing well and has no real complaints today.  She does have concerns over radiation but has an appointment with them later this week along with the medical oncologist.    Plan:  I will see her back in the office in 2 months.          EMR Dragon/transcription disclaimer:    Much of this encounter note is an electronic transcription/translocation of spoken language to printed text.  The electronic translation of spoken language may permit erroneous, or at times, nonsensical words or phrases to be inadvertently transcribed.  Although I have reviewed the note from such areas, some may still exist.

## 2019-08-08 ENCOUNTER — CONSULT (OUTPATIENT)
Dept: ONCOLOGY | Facility: CLINIC | Age: 54
End: 2019-08-08

## 2019-08-08 ENCOUNTER — APPOINTMENT (OUTPATIENT)
Dept: LAB | Facility: HOSPITAL | Age: 54
End: 2019-08-08

## 2019-08-08 ENCOUNTER — CLINICAL SUPPORT (OUTPATIENT)
Dept: ONCOLOGY | Facility: CLINIC | Age: 54
End: 2019-08-08

## 2019-08-08 VITALS
OXYGEN SATURATION: 98 % | TEMPERATURE: 98.4 F | HEART RATE: 71 BPM | BODY MASS INDEX: 22.59 KG/M2 | RESPIRATION RATE: 16 BRPM | WEIGHT: 135.6 LBS | SYSTOLIC BLOOD PRESSURE: 105 MMHG | HEIGHT: 65 IN | DIASTOLIC BLOOD PRESSURE: 65 MMHG

## 2019-08-08 DIAGNOSIS — Z00.00 ENCOUNTER FOR PREVENTATIVE ADULT HEALTH CARE EXAMINATION: ICD-10-CM

## 2019-08-08 DIAGNOSIS — D05.11 DUCTAL CARCINOMA IN SITU (DCIS) OF RIGHT BREAST: Primary | ICD-10-CM

## 2019-08-08 DIAGNOSIS — Z78.0 POST-MENOPAUSAL: ICD-10-CM

## 2019-08-08 LAB
BASOPHILS # BLD AUTO: 0.09 10*3/MM3 (ref 0–0.2)
BASOPHILS NFR BLD AUTO: 1 % (ref 0–1.5)
DEPRECATED RDW RBC AUTO: 42.3 FL (ref 37–54)
EOSINOPHIL # BLD AUTO: 0.73 10*3/MM3 (ref 0–0.4)
EOSINOPHIL NFR BLD AUTO: 7.8 % (ref 0.3–6.2)
ERYTHROCYTE [DISTWIDTH] IN BLOOD BY AUTOMATED COUNT: 12.1 % (ref 12.3–15.4)
HCT VFR BLD AUTO: 38.9 % (ref 34–46.6)
HGB BLD-MCNC: 12.7 G/DL (ref 12–15.9)
IMM GRANULOCYTES # BLD AUTO: 0.03 10*3/MM3 (ref 0–0.05)
IMM GRANULOCYTES NFR BLD AUTO: 0.3 % (ref 0–0.5)
LYMPHOCYTES # BLD AUTO: 3.57 10*3/MM3 (ref 0.7–3.1)
LYMPHOCYTES NFR BLD AUTO: 38.2 % (ref 19.6–45.3)
MCH RBC QN AUTO: 31.1 PG (ref 26.6–33)
MCHC RBC AUTO-ENTMCNC: 32.6 G/DL (ref 31.5–35.7)
MCV RBC AUTO: 95.1 FL (ref 79–97)
MONOCYTES # BLD AUTO: 0.8 10*3/MM3 (ref 0.1–0.9)
MONOCYTES NFR BLD AUTO: 8.6 % (ref 5–12)
NEUTROPHILS # BLD AUTO: 4.13 10*3/MM3 (ref 1.7–7)
NEUTROPHILS NFR BLD AUTO: 44.1 % (ref 42.7–76)
NRBC BLD AUTO-RTO: 0 /100 WBC (ref 0–0.2)
PLATELET # BLD AUTO: 234 10*3/MM3 (ref 140–450)
PMV BLD AUTO: 10.8 FL (ref 6–12)
RBC # BLD AUTO: 4.09 10*6/MM3 (ref 3.77–5.28)
WBC NRBC COR # BLD: 9.35 10*3/MM3 (ref 3.4–10.8)

## 2019-08-08 PROCEDURE — 36416 COLLJ CAPILLARY BLOOD SPEC: CPT | Performed by: INTERNAL MEDICINE

## 2019-08-08 PROCEDURE — 85025 COMPLETE CBC W/AUTO DIFF WBC: CPT | Performed by: INTERNAL MEDICINE

## 2019-08-08 PROCEDURE — 99245 OFF/OP CONSLTJ NEW/EST HI 55: CPT | Performed by: INTERNAL MEDICINE

## 2019-08-08 NOTE — PROGRESS NOTES
Subjective     REASON FOR CONSULTATION: Intermediate grade DCIS right breast ER WA positive post lumpectomy    Provide an opinion on any further workup or treatment                             REQUESTING PHYSICIAN: MD Ari Shaw MD      RECORDS OBTAINED:  Records of the patients history including those obtained from the referring provider were reviewed and summarized in detail.    HISTORY OF PRESENT ILLNESS:  The patient is a 54 y.o. year old female who is here for an opinion about the above issue.    History of Present Illness patient is a 54-year-old female with long history of irritable bowel syndrome who was noted on her routine 3D mammogram this year to have an faint indistinct microcalcifications in the right breast that was not seen a year previously.  This led to a diagnostic mammogram and stereotactic biopsy on 2019 which revealed Intermediate grade DCIS cribriform type with focal necrosis measuring 4mm with a minute intraductal papilloma % WA 12% .  Patient was referred to Dr. Myers and underwent needle localization and lumpectomy on 2019 with the findings of a 1.6 mm area of intermediate grade DCIS.  Margins were close and reexcised in the superior margin there with an additional 0.5 mm area of micropapillary DCIS again with a clear margin although only 1.5 mm.  She has done well postoperatively and is here to discuss any additional treatment options  Patient reports a cyst biopsied in her left breast which was benign in     She is  1 para 1 menarche was at age 11 menopause at 48 she is been on no hormone replacement therapy first childbirth was at age 20 she did not breast-feed  Family history is positive for father diagnosed with prostate cancer at age 60  at 76 of metastatic prostate cancer mother had a brain cancer and  at age 65 she has 1 brother and 2 sisters were healthy she has 2 paternal  aunts with breast cancer in her 60s a paternal aunt with colon cancer in her 60s and 3 paternal uncles with prostate cancer in her 60s for a total of 7 out of 11 siblings on her father's side with malignancy      Past Medical History:   Diagnosis Date   • Anxiety    • Breast cancer (CMS/HCC)     RIGHT    • History of irregular heartbeat    • IBS (irritable bowel syndrome)    • Lower back pain    • PONV (postoperative nausea and vomiting)    • Tinnitus         Past Surgical History:   Procedure Laterality Date   • BREAST BIOPSY       Left-benign 2019 Right-malignant   • BREAST LUMPECTOMY Right 2019    Procedure: RIGHT BREAST LUMPECTOMY WITH NEEDLE LOCALIZATION;  Surgeon: Ervin Myers MD;  Location: [facility];  Service: General        Current Outpatient Medications on File Prior to Visit   Medication Sig Dispense Refill   • mirtazapine (REMERON) 15 MG tablet Take 15 mg by mouth Every Night.  1   • aspirin 81 MG chewable tablet Chew 81 mg Daily.     • HYDROcodone-acetaminophen (NORCO) 5-325 MG per tablet Take 1-2 tablets by mouth Every 4 (Four) Hours As Needed (Pain). 10 tablet 0   • [DISCONTINUED] Chlorhexidine Gluconate (HIBICLENS EX) Apply 1 application topically Take As Directed.       No current facility-administered medications on file prior to visit.         ALLERGIES:  No Known Allergies     Social History     Socioeconomic History   • Marital status:      Spouse name: Edwige   • Number of children: 1   • Years of education: Not on file   • Highest education level: Not on file   Occupational History     Employer: [employer]   Tobacco Use   • Smoking status: Former Smoker     Packs/day: 0.50     Years: 30.00     Pack years: 15.00     Types: Cigarettes     Last attempt to quit:      Years since quittin.6   • Smokeless tobacco: Never Used   Substance and Sexual Activity   • Alcohol use: No     Frequency: Never   • Drug use: No   • Sexual activity: No     "    Family History   Problem Relation Age of Onset   • Depression Mother    • Brain cancer Mother    • Prostate cancer Father    • Breast cancer Paternal Aunt    • Prostate cancer Paternal Uncle    • Cancer Paternal Grandfather         eye   • Breast cancer Paternal Aunt    • Colon cancer Paternal Aunt    • Prostate cancer Paternal Uncle    • Throat cancer Paternal Uncle    • Malig Hyperthermia Neg Hx         Review of Systems   Constitutional: Positive for unexpected weight change (Weight gain and weight loss due to IBS).   Gastrointestinal: Positive for constipation and diarrhea.   Musculoskeletal: Positive for back pain (Chronic).        Objective     Vitals:    08/08/19 1421   BP: 105/65   Pulse: 71   Resp: 16   Temp: 98.4 °F (36.9 °C)   SpO2: 98%   Weight: 61.5 kg (135 lb 9.6 oz)   Height: 165.1 cm (65\")  Comment: new ht w/o shoes   PainSc: 0-No pain     Current Status 8/8/2019   ECOG score 0       Physical Exam   Pulmonary/Chest:           GENERAL:  Well-developed, well-nourished in no acute distress.   SKIN:  Warm, dry without rashes, purpura or petechiae.  EYES:  Pupils equal, round and reactive to light.  EOMs intact.  Conjunctivae normal.  EARS:  Hearing intact.  NOSE:  Septum midline.  No excoriations or nasal discharge.  MOUTH:  Tongue is well-papillated; no stomatitis or ulcers.  Lips normal.  THROAT:  Oropharynx without lesions or exudates.  NECK:  Supple with good range of motion; no thyromegaly or masses, no JVD.  LYMPHATICS:  No cervical, supraclavicular, axillary or inguinal adenopathy.  CHEST:  Lungs clear to auscultation. Good airflow.  BREASTS: Right breast shows a well-healed lumpectomy scar with a small tender nodule just above the incision left breast shows an old biopsy scar in the upper quadrant no masses noted  CARDIAC:  Regular rate and rhythm without murmurs, rubs or gallops. Normal S1,S2.  ABDOMEN:  Soft, nontender with no hepatosplenomegaly or masses.  EXTREMITIES:  No clubbing, " cyanosis or edema.  Ankles slightly puffy  NEUROLOGICAL:  Cranial Nerves II-XII grossly intact.  No focal neurological deficits.  PSYCHIATRIC:  Normal affect and mood.        RECENT LABS:  Hematology WBC   Date Value Ref Range Status   08/08/2019 9.35 3.40 - 10.80 10*3/mm3 Final     RBC   Date Value Ref Range Status   08/08/2019 4.09 3.77 - 5.28 10*6/mm3 Final     Hemoglobin   Date Value Ref Range Status   08/08/2019 12.7 12.0 - 15.9 g/dL Final     Hematocrit   Date Value Ref Range Status   08/08/2019 38.9 34.0 - 46.6 % Final     Platelets   Date Value Ref Range Status   08/08/2019 234 140 - 450 10*3/mm3 Final                          Tissue Pathology Exam: [id number]   Order: [id number]   Collected:  7/24/2019 12:23 Status:  Final result   Visible to patient:  No (Not Released) Dx:  Ductal carcinoma in situ (DCIS) of ri...   Component    Final Diagnosis   1. Right Breast Lumpectomy:                 A. Ductal carcinoma in-situ (DCIS) with apocrine features.                            1. DCIS spans area estimated at 15 mm x 9 mm x 5 mm in greatest dimensions.                            2. Intermediate nuclear grade with solid, cribriform and micropapillary architecture                                with focal necrosis.               B. No invasive carcinoma identified (see comment).               C. Ductal carcinoma in-situ closely approximates multiple margins and                    DCIS is present at the (Anterior) margin of excision.                     DCIS from remaining margins measures:                            Posterior margin:  1.0 mm.                            Superior margin: 0.4 mm.                            Inferior margin: 5 mm.                            Medial margin 1.0 mm.                            Lateral margin 0.9 mm.                  D.  Microcalcification present in DCIS and benign ductal structures.               E.  Previously reported Biomarkers on DCIS: Estrogen receptor: Positive  (100%),                     Progesterone receptor: Positive (12%), Ki-67=15% by outside report (not reviewed).     2. Right Breast, Additional New Lateral margin:               A. No in-situ nor infiltrating carcinoma.               B. New lateral margin is negative for malignancy by 10 mm.     3. Right Breast, Additional New Posterior margin:               A. No in-situ nor infiltrating carcinoma.               B. New posterior margin is negative for malignancy by additional 5 mm.     4. Right Breast,  Additional New Inferior Margin:               A. No in-situ nor infiltrating carcinoma.               B. New inferior margin is negative for malignancy by additional 5 mm.     5. Right Breast, Additional New Medial Margin:               A. No in-situ nor infiltrating carcinoma.               B. New margin is negative for malignancy by additional 10 mm.     6. Right Breast, Additional New Superior Margin:               A. Focal low grade micropapillary ductal carcinoma in situ,  0.5 mm.               B. No infiltrating carcinoma.               C. New margin is negative for malignancy by additional 1.5 mm.     7. Right Breast, Additional New Anterior Margin:               A. No in-situ nor infiltrating carcinoma.               B. New margin is negative for malignancy by additional 6 mm.     Pathologic stage:  pTis (DCIS), NX (intermediate grade DCIS).  See synoptic for tumor details.     Jat/kds            Electronically signed by Guillermo Munguia MD on 7/26/2019                  Assessment/Plan    1. Intermediate grade DCIS  ER VT positive postlumpectomy  2.  Chronic irritable bowel syndrome  3 positive paternal family history of.  Multiple malignancies    Plan  1.  DEXA scan  2. Return in 3 to 4 months to discuss prevention    Had a long conversation with the patient and her  regarding risks and benefits of radiation and chemoprevention with tamoxifen versus aromatase inhibitor.  She is very concerned that  with her IBS she will not be able to take an oral medication and I think is leaning towards radiation provided at her consultation they can reassure her that there will be any undue side effects and long-term risks.    She realizes that prevention does not affect her survival but just the risk of a another DCIS or invasive cancer in her breasts and I think she is willing to try some medication but has a low threshold for discontinuing especially for IBS worsens and I think this is very reasonable    Follow-up in 3 months has been scheduled and I have also recommended genetic testing because of the unusually high number of cancers in her father's family with 7 of the 11 siblings having malignancies-this information may further increase her wish to take prevention    45 minutes, over half of that time counseling.

## 2019-08-09 ENCOUNTER — APPOINTMENT (OUTPATIENT)
Dept: RADIATION ONCOLOGY | Facility: HOSPITAL | Age: 54
End: 2019-08-09

## 2019-08-09 ENCOUNTER — CONSULT (OUTPATIENT)
Dept: RADIATION ONCOLOGY | Facility: HOSPITAL | Age: 54
End: 2019-08-09

## 2019-08-09 VITALS
SYSTOLIC BLOOD PRESSURE: 102 MMHG | OXYGEN SATURATION: 99 % | DIASTOLIC BLOOD PRESSURE: 66 MMHG | WEIGHT: 137 LBS | BODY MASS INDEX: 22.8 KG/M2 | HEART RATE: 76 BPM

## 2019-08-09 DIAGNOSIS — D05.11 DUCTAL CARCINOMA IN SITU (DCIS) OF RIGHT BREAST: Primary | ICD-10-CM

## 2019-08-09 PROCEDURE — G0463 HOSPITAL OUTPT CLINIC VISIT: HCPCS | Performed by: RADIOLOGY

## 2019-08-09 PROCEDURE — 99244 OFF/OP CNSLTJ NEW/EST MOD 40: CPT | Performed by: RADIOLOGY

## 2019-08-09 NOTE — PROGRESS NOTES
DIAGNOSIS and REASON FOR CONSULTATION:  1. Ductal carcinoma in situ (DCIS) of right breast     - for advice and recommendations regarding the diagnosis    Referring Provider:  Ervin Myers MD  Patient Care Team:  Ari Barba MD as PCP - General (Family Medicine)  Eliana Loving MD as Consulting Physician (Radiation Oncology)  Ervin Myers MD as Referring Physician (Breast Surgery)  Noemi Li MD as Consulting Physician (Hematology and Oncology)  Shy Durant DO (Obstetrics and Gynecology)  Frantz Krishna MD (Gastroenterology)    CHIEF COMPLAINT:  For advice and recommendations regarding Ductal carcinoma in situ (DCIS) of right breast     HISTORY OF PRESENT ILLNESS:  The patient is a 54 y.o. year old female who was found to have an abnormality on routine screening mammogram dated May 6 7, 2019.  This revealed microcalcifications in the lower outer quadrant of the right breast.  She underwent diagnostic mammogram on May 21, 2019 which showed the indeterminate group of microcalcifications in the upper outer right breast and biopsy was recommended.  She underwent stereotactic biopsy and clip placement on May 31, 2019 which revealed intermediate grade ductal carcinoma in situ, cribriform type, focal necrosis measuring 4 mm in greatest dimension.  The tissue was found to be positive for estrogen receptors at 100% and positive for progesterone receptors at 12%.    She went on to right sided lumpectomy on July 24, 2019 and the pathology again revealed a ductal carcinoma in situ with apocrine features measuring 1.5 x 0.9 x 0.5 cm in greatest dimension, intermediate grade with necrosis.  The tissue was initially at the anterior margin of excision but further tissue there showed no evidence of malignancy.  Of note there was focal low-grade micropapillary ductal carcinoma in situ measuring 0.5 mm noted in the superior margin specimen but the margin then was negative.  Therefore she appears to  have a Tis DCIS hormone receptor positive of the right breast.    Clinically, she did well postoperatively and has seen Dr. Li for a medical oncology opinion.  They discussed the possibility of chemoprevention which she will think about for a couple of months.  She returns there on November 12, 2019.I was asked to see the patient at the request of the referring provider noted above for advice and recommendations regarding this diagnosis.     Performance Status: (1) Restricted in physically strenuous activity, ambulatory and able to do work of light nature    Past Medical History: she  has a past medical history of Anxiety, Breast cancer (CMS/HCC), History of irregular heartbeat, IBS (irritable bowel syndrome), Lower back pain, PONV (postoperative nausea and vomiting), and Tinnitus.    Past Surgical History:  she has a past surgical history that includes Breast biopsy and Breast lumpectomy (Right, 7/24/2019).    Meds:    Current Outpatient Medications:   •  mirtazapine (REMERON) 15 MG tablet, Take 15 mg by mouth Every Night., Disp: , Rfl: 1  •  aspirin 81 MG chewable tablet, Chew 81 mg Daily., Disp: , Rfl:     Allergies:  No Known Allergies    Family History:  her family history includes Brain cancer in her mother; Breast cancer in her paternal aunt and paternal aunt; Cancer in her paternal grandfather; Colon cancer in her paternal aunt; Depression in her mother; Prostate cancer in her father, paternal uncle, and paternal uncle; Throat cancer in her paternal uncle.    Social History:  she  reports that she quit smoking about 11 years ago. Her smoking use included cigarettes. She has a 15.00 pack-year smoking history. She has never used smokeless tobacco. She reports that she does not drink alcohol or use drugs.    Pertinent Findings on   Review of Systems   Constitutional: Negative for appetite change, chills, diaphoresis, fatigue, fever and unexpected weight change.   HENT:   Positive for hearing loss and  tinnitus. Negative for lump/mass, mouth sores, nosebleeds, sore throat, trouble swallowing and voice change.    Eyes: Negative for eye problems and icterus.   Respiratory: Negative for chest tightness, cough, hemoptysis, shortness of breath and wheezing.    Cardiovascular: Negative for chest pain, leg swelling and palpitations.   Gastrointestinal: Positive for diarrhea. Negative for abdominal distention, abdominal pain, blood in stool, constipation, nausea, rectal pain and vomiting.   Endocrine: Negative for hot flashes.   Genitourinary: Negative for bladder incontinence, difficulty urinating, dyspareunia, dysuria, frequency, hematuria, menstrual problem, nocturia, pelvic pain, vaginal bleeding and vaginal discharge.    Musculoskeletal: Negative for arthralgias, back pain, flank pain, gait problem, myalgias, neck pain and neck stiffness.   Skin: Positive for itching. Negative for rash and wound.   Neurological: Negative for dizziness, extremity weakness, gait problem, headaches, light-headedness, numbness, seizures and speech difficulty.   Hematological: Negative for adenopathy. Does not bruise/bleed easily.   Psychiatric/Behavioral: Negative for confusion, decreased concentration, depression, sleep disturbance and suicidal ideas. The patient is not nervous/anxious.    :  Vitals:    08/09/19 0910   BP: 102/66   Pulse: 76   SpO2: 99%   Weight: 62.1 kg (137 lb)   PainSc: 0-No pain         Pertinent Findings on:  Physical Exam   Constitutional: She is oriented to person, place, and time. She appears well-developed and well-nourished. She is cooperative.   HENT:   Head: Normocephalic.   Neck: Normal range of motion. No erythema present.   Pulmonary/Chest: Breath sounds normal. She has no wheezes. She exhibits no mass, no tenderness and no edema. Right breast exhibits no inverted nipple, no mass, no nipple discharge, no skin change and no tenderness. Left breast exhibits no inverted nipple, no mass, no nipple discharge,  no skin change and no tenderness. There is no breast swelling.   The left breast is without abnormality as is the left axilla. The right breast shows a healing lumpectomy incision over the lateral central aspect of the breast. There is no skin or nipple abnormality, no warmth or erythema.     Musculoskeletal: Normal range of motion.   Lymphadenopathy:     She has no cervical adenopathy.     She has no axillary adenopathy.        Right axillary: No pectoral adenopathy present.        Left axillary: No pectoral adenopathy present.       Right: No supraclavicular adenopathy present.        Left: No supraclavicular adenopathy present.   There is no palpable axillary, supraclavicular or cervical lymphadenopathy appreciated. No lymphedema is noted in either upper extremity.   Neurological: She is alert and oriented to person, place, and time.   Psychiatric: Her mood appears not anxious. Her affect is not angry. She does not exhibit a depressed mood.     Assessment:   1. Ductal carcinoma in situ (DCIS) of right breast       This assessment comes from my review of the imaging, pathology, physician notes and other pertinent information as mentioned.    Plan:   We reviewed the specifics of her clinical, imaging and pathology findings today and also talked through the role of radiation therapy in breast conservation treatment and specifically in DCIS.  We talked thru the prognostic factors and the risk of local recurrence associated with those, both intradutal and invasive. We discussed specifically the size of the DCIS, margin status, grade with or without necrosis and the hormone receptor status. She expressed understanding and asked multiple questions.    We discussed specifically her chances of local recurrence using the standard nomograms being a 5 and 10-year risk of local recurrence of 2% and 4% respectively if she pursued radiation and hormonal therapy, 13% and 20% respectively if she pursues neither and 5-6% and 8-10%  respectively if she does either radiation or hormonal therapy.    We discussed a standard course of postoperative whole breast radiation therapy consisting of 4256 cGy aimed at the intact right breast given over 16 treatments. The above course of treatment should be completed in 3 1/2 weeks.      We discussed the specifics, logistics and side effects of this course of treatment  which may involve acutely, irritation of the skin, including erythema and possibly moist desquamation, tenderness of the musculature of the chest wall and mild fatigue. We discussed the long term possibility of mild hyperpigmentation and fibrosis of the breast, mild increased incidence of rib fracture and radiation pneumonitis.  We also discussed the importance of our treatment planning process in protecting these underlying structures as much as possible, specifically ribs and lung and I believe all her questions were answered to her satisfaction.      She lives and works in [place] and we did discuss the possibility of receiving radiation closer to home.  She will think over that.  Additionally she still is not sure if she will pursue postoperative treatment however I ensure that all her questions were answered and she left with our card if she should develop other questions or wishes to proceed on with treatment she will let us know.    I spent greater than 60 minutes in face-to-face time with the patient and 40 minutes of that time were spent in counseling and coordination of care, including review of imaging and pathology; indications, goals, logistics, alternatives and risks - both common and rare - for my recommendations as well as surveillance and potential outcomes.

## 2019-08-12 NOTE — PROGRESS NOTES
"On 8/8/19, SABINOW met with the patient prior to her consultation with Dr. Li about possible treatment for her DCIS. She had a lumpectomy 2 weeks ago, and said she had no pain following surgery. This was found on a routine mammogram. She states she is very confused about whether or not she needs further treatment. She said she has been reading a website called \"DCIS redefined\", but seems to only be more confused now. She thinks that further treatment could be radiation and/or hormone blocking medication. She initially saw an oncologist in [place], but she said he became defensive when she began to ask questions. She has 2 cousins who work at [facility] and another cousin who was treated here by Dr. Li.    Pt reports that she has irritable bowel syndrome, and has diarrhea \"all the time.\" She restricts her diet, but still has problems. She worries about how any medication would impact this condition.    Pt is  and has 1 child. She works at [employer]. She retired from the [employer] after 32 years, having served most of that time in the [employer]. Her  works at [employer] too. He was in the waiting room during our visit, but will be with her when she meets with Dr. Li.     Pt scored a 6 on the Distress Questionnaire, with a rating of Mild impact on her functioning. She marked \"treatment decision, fears, nervousness/anxiety, diarrhea and itchy skin. Pt is alert, oriented x 3, and likely above average in intelligence. She was very neatly groomed and seemed to share her history openly. LISANDRO explained the social work role and availability in our office, and offered assistance as needed in the future.  "

## 2019-08-23 ENCOUNTER — OFFICE VISIT (OUTPATIENT)
Dept: RADIATION ONCOLOGY | Facility: HOSPITAL | Age: 54
End: 2019-08-23

## 2019-08-23 DIAGNOSIS — D05.11 DUCTAL CARCINOMA IN SITU (DCIS) OF RIGHT BREAST: Primary | ICD-10-CM

## 2019-08-23 PROCEDURE — 77263 THER RADIOLOGY TX PLNG CPLX: CPT | Performed by: RADIOLOGY

## 2019-08-23 PROCEDURE — 77290 THER RAD SIMULAJ FIELD CPLX: CPT | Performed by: RADIOLOGY

## 2019-08-23 PROCEDURE — 99214 OFFICE O/P EST MOD 30 MIN: CPT | Performed by: RADIOLOGY

## 2019-08-23 PROCEDURE — 77333 RADIATION TREATMENT AID(S): CPT | Performed by: RADIOLOGY

## 2019-08-23 NOTE — PROGRESS NOTES
DIAGNOSIS and REASON FOR SECOND VISIT:  1. Ductal carcinoma in situ (DCIS) of right breast     - for advice and recommendations regarding the diagnosis    Referring Provider:  No ref. provider found  Patient Care Team:  Ari Barba MD as PCP - General (Family Medicine)  Eliana Loving MD as Consulting Physician (Radiation Oncology)  Ervin Myers MD as Referring Physician (Breast Surgery)  Noemi Li MD as Consulting Physician (Hematology and Oncology)  Shy Durant DO (Obstetrics and Gynecology)  Frantz Krishna MD (Gastroenterology)    CHIEF COMPLAINT:  For advice and recommendations regarding Ductal carcinoma in situ (DCIS) of right breast     HISTORY OF PRESENT ILLNESS:  The patient is a 54 y.o. year old female who I originally saw on August 9th who wished to think further before pursuing the radiation therapy. She phoned and let us kow she was interested in perhaps pursuing it but had further questions.    In short, she was found to have an abnormality on routine screening mammogram dated May 6 7, 2019.  This revealed microcalcifications in the lower outer quadrant of the right breast.  She underwent diagnostic mammogram on May 21, 2019 which showed the indeterminate group of microcalcifications in the upper outer right breast and biopsy was recommended.  She underwent stereotactic biopsy and clip placement on May 31, 2019 which revealed intermediate grade ductal carcinoma in situ, cribriform type, focal necrosis measuring 4 mm in greatest dimension.  The tissue was found to be positive for estrogen receptors at 100% and positive for progesterone receptors at 12%. She went on to right sided lumpectomy on July 24, 2019 and the pathology again revealed a ductal carcinoma in situ with apocrine features measuring 1.5 x 0.9 x 0.5 cm in greatest dimension, intermediate grade with necrosis.  The tissue was initially at the anterior margin of excision but further tissue there showed no  evidence of malignancy.  Of note there was focal low-grade micropapillary ductal carcinoma in situ measuring 0.5 mm noted in the superior margin specimen but the margin then was negative.  Therefore she appears to have a Tis DCIS hormone receptor positive of the right breast.    She continues to think about taking the hormonal therapy.      Performance Status: (1) Restricted in physically strenuous activity, ambulatory and able to do work of light nature    Past Medical History: she  has a past medical history of Anxiety, Breast cancer (CMS/HCC), History of irregular heartbeat, IBS (irritable bowel syndrome), Lower back pain, PONV (postoperative nausea and vomiting), and Tinnitus.    Past Surgical History:  she has a past surgical history that includes Breast biopsy and Breast lumpectomy (Right, 7/24/2019).    Meds:    Current Outpatient Medications:   •  aspirin 81 MG chewable tablet, Chew 81 mg Daily., Disp: , Rfl:   •  mirtazapine (REMERON) 15 MG tablet, Take 15 mg by mouth Every Night., Disp: , Rfl: 1    Allergies:  No Known Allergies    Family History:  her family history includes Brain cancer in her mother; Breast cancer in her paternal aunt and paternal aunt; Cancer in her paternal grandfather; Colon cancer in her paternal aunt; Depression in her mother; Prostate cancer in her father, paternal uncle, and paternal uncle; Throat cancer in her paternal uncle.    Social History:  she  reports that she quit smoking about 11 years ago. Her smoking use included cigarettes. She has a 15.00 pack-year smoking history. She has never used smokeless tobacco. She reports that she does not drink alcohol or use drugs.    Pertinent Findings on   Review of Systems   Constitutional: Negative for appetite change, chills, diaphoresis, fatigue, fever and unexpected weight change.   HENT:   Positive for hearing loss and tinnitus. Negative for lump/mass, mouth sores, nosebleeds, sore throat, trouble swallowing and voice change.     Eyes: Negative for eye problems and icterus.   Respiratory: Negative for chest tightness, cough, hemoptysis, shortness of breath and wheezing.    Cardiovascular: Negative for chest pain, leg swelling and palpitations.   Gastrointestinal: Positive for diarrhea. Negative for abdominal distention, abdominal pain, blood in stool, constipation, nausea, rectal pain and vomiting.   Endocrine: Negative for hot flashes.   Genitourinary: Negative for bladder incontinence, difficulty urinating, dyspareunia, dysuria, frequency, hematuria, menstrual problem, nocturia, pelvic pain, vaginal bleeding and vaginal discharge.    Musculoskeletal: Negative for arthralgias, back pain, flank pain, gait problem, myalgias, neck pain and neck stiffness.   Skin: Positive for itching. Negative for rash and wound.   Neurological: Negative for dizziness, extremity weakness, gait problem, headaches, light-headedness, numbness, seizures and speech difficulty.   Hematological: Negative for adenopathy. Does not bruise/bleed easily.   Psychiatric/Behavioral: Negative for confusion, decreased concentration, depression, sleep disturbance and suicidal ideas. The patient is not nervous/anxious.    :  There were no vitals filed for this visit.      Pertinent Findings on:  Physical Exam   Constitutional: She is oriented to person, place, and time. She appears well-developed and well-nourished. She is cooperative.   HENT:   Head: Normocephalic.   Neck: Normal range of motion. No erythema present.   Pulmonary/Chest: Breath sounds normal. She has no wheezes. She exhibits no mass, no tenderness and no edema. Right breast exhibits no inverted nipple, no mass, no nipple discharge, no skin change and no tenderness. Left breast exhibits no inverted nipple, no mass, no nipple discharge, no skin change and no tenderness. There is no breast swelling.   The left breast is without abnormality as is the left axilla. The right breast shows a healing lumpectomy incision  over the lateral central aspect of the breast. There is no skin or nipple abnormality, no warmth or erythema.     Musculoskeletal: Normal range of motion.   Lymphadenopathy:     She has no cervical adenopathy.     She has no axillary adenopathy.        Right axillary: No pectoral adenopathy present.        Left axillary: No pectoral adenopathy present.       Right: No supraclavicular adenopathy present.        Left: No supraclavicular adenopathy present.   There is no palpable axillary, supraclavicular or cervical lymphadenopathy appreciated. No lymphedema is noted in either upper extremity.   Neurological: She is alert and oriented to person, place, and time.   Psychiatric: Her mood appears not anxious. Her affect is not angry. She does not exhibit a depressed mood.     Assessment:   1. Ductal carcinoma in situ (DCIS) of right breast       This assessment comes from my review of the imaging, pathology, physician notes and other pertinent information as mentioned.    Plan:   She had multiple questions that were answered, specifically about normal tissue effects. We reviewed the side effects which may involve acutely, irritation of the skin, including erythema and possibly moist desquamation, tenderness of the musculature of the chest wall and mild fatigue. We reviewed the long term possibility of mild hyperpigmentation and fibrosis of the breast, mild increased incidence of rib fracture and radiation pneumonitis.  We also discussed the importance of our treatment planning process in protecting these underlying structures as much as possible, specifically ribs and lung and I believe all her questions were answered to her satisfaction.  She continues to have concerns about secondary cancer risk and is relating that to developing a lung cancer from smoking. We discussed the differences at great length and she will continue to think about all we discussed.    I spent greater than 25 minutes in face-to-face time with the  patient and 15 minutes of that time were spent in counseling and coordination of care, including review of imaging and pathology; indications, goals, logistics, alternatives and risks - both common and rare - for my recommendations as well as surveillance and potential outcomes.

## 2019-08-27 PROCEDURE — 77295 3-D RADIOTHERAPY PLAN: CPT | Performed by: RADIOLOGY

## 2019-08-27 PROCEDURE — 77334 RADIATION TREATMENT AID(S): CPT | Performed by: RADIOLOGY

## 2019-08-27 PROCEDURE — 77300 RADIATION THERAPY DOSE PLAN: CPT | Performed by: RADIOLOGY

## 2019-08-28 PROCEDURE — 77280 THER RAD SIMULAJ FIELD SMPL: CPT | Performed by: RADIOLOGY

## 2019-08-28 PROCEDURE — 77427 RADIATION TX MANAGEMENT X5: CPT | Performed by: RADIOLOGY

## 2019-08-28 PROCEDURE — 77412 RADIATION TX DELIVERY LVL 3: CPT | Performed by: RADIOLOGY

## 2019-08-30 PROCEDURE — 77412 RADIATION TX DELIVERY LVL 3: CPT | Performed by: RADIOLOGY

## 2019-08-30 PROCEDURE — 77387 GUIDANCE FOR RADJ TX DLVR: CPT | Performed by: RADIOLOGY

## 2019-09-01 ENCOUNTER — APPOINTMENT (OUTPATIENT)
Dept: RADIATION ONCOLOGY | Facility: HOSPITAL | Age: 54
End: 2019-09-01

## 2019-09-02 PROCEDURE — 77336 RADIATION PHYSICS CONSULT: CPT | Performed by: RADIOLOGY

## 2019-09-03 PROCEDURE — 77387 GUIDANCE FOR RADJ TX DLVR: CPT | Performed by: RADIOLOGY

## 2019-09-03 PROCEDURE — 77412 RADIATION TX DELIVERY LVL 3: CPT | Performed by: RADIOLOGY

## 2019-09-04 PROCEDURE — 77387 GUIDANCE FOR RADJ TX DLVR: CPT | Performed by: RADIOLOGY

## 2019-09-04 PROCEDURE — 77412 RADIATION TX DELIVERY LVL 3: CPT | Performed by: RADIOLOGY

## 2019-09-05 ENCOUNTER — RADIATION ONCOLOGY WEEKLY ASSESSMENT (OUTPATIENT)
Dept: RADIATION ONCOLOGY | Facility: HOSPITAL | Age: 54
End: 2019-09-05

## 2019-09-05 VITALS — SYSTOLIC BLOOD PRESSURE: 105 MMHG | OXYGEN SATURATION: 98 % | HEART RATE: 82 BPM | DIASTOLIC BLOOD PRESSURE: 62 MMHG

## 2019-09-05 DIAGNOSIS — D05.11 DUCTAL CARCINOMA IN SITU (DCIS) OF RIGHT BREAST: Primary | ICD-10-CM

## 2019-09-05 PROCEDURE — 77412 RADIATION TX DELIVERY LVL 3: CPT | Performed by: RADIOLOGY

## 2019-09-05 PROCEDURE — 77387 GUIDANCE FOR RADJ TX DLVR: CPT | Performed by: RADIOLOGY

## 2019-09-05 NOTE — PROGRESS NOTES
Physician Weekly Management Note    Diagnosis:     1. Ductal carcinoma in situ (DCIS) of right breast     Stage 0    Reason for Visit: Radiation (5/16)    Concurrent Chemo:   No    Notes on Treatment course, Films, Medical progress and Plan:  Doing well. No problems - multiple questions answered, again about secondary malignancy and lung cancer, cont on.    Performance Status: (1) Restricted in physically strenuous activity, ambulatory and able to do work of light nature    ROS - Other than as listed above, as follow:  Constitutional - Normal - Denies lack of appetite, fatigue, fever, night sweats and change in weight.  Neck - Normal - Denies neck masses, muscle weakness, neck pain, decreased range of motion and swelling of the neck.  Breasts - Normal - Denies breast masses, nipple discharge, nipple inversion and pain.  Respiratory - Normal - Denies cough, dyspnea, hemoptysis, hiccoughs, pleuritic chest pain and wheezing.  Gastrointestinal - Normal - Denies abdominal pain, constipation, diarrhea, heartburn / dyspepsia, hematemesis, hemorrhoids, melena / GI bleeding, nausea, pain / cramping, satiety and vomiting.  Musculoskeletal - Normal - Denies arthritis, bone pain, joint pain, muscle weakness and decreased range of motion.   Hematologic/Lymphatic - Normal - Denies easy bruising and tender or enlarged lymph nodes.    PYHSICAL EXAM - Other than listed above, as follows:  Vitals:    09/05/19 1414   BP: 105/62   Pulse: 82   SpO2: 98%   PainSc: 0-No pain       Constitutional - Normal - No evidence of impaired alertness, inadequate appearance, premature or advanced chronologic age, uncooperativeness, altered mood and affect and disorientation.  Neck - Normal - No evidence of tender or enlarged lymph nodes, neck abnormalities, restricted range of motion and enlarged thyroid gland.  Breasts - Normal - No change in nipple or incision site.  Chest - Normal - No evidence of chest abnormalities and tender or enlarged lymph  "nodes.  Hematologic/Lymphatic -  Normal - No evidence of tender or enlarged axillae lymph nodes and tender or enlarged neck lymph nodes.    Problem added:  No problems updated.  Medications added: No orders of the defined types were placed in this encounter.    Ancillary referrals made: No orders of the defined types were placed in this encounter.      Technical aspects reviewed:  Weekly OBI approved if applicable? Yes  Weekly port films approved?   Yes  Change requests noted if applicable? Yes  Patient setup and plan reviewed?  Yes    Chart Reviewed:  Continue current treatment plan?   Yes  Treatment plan change requested?  No    I have reviewed and marked as \"reviewed\" the current medications, allergies and problem list in the patients EMR.    I have reviewed the patient's medical, surgical  history in detail, reviewed any pertinent lab work  and updated the computerized patient record if needed.    Patient's Care Team:  Patient Care Team:  Ari Barba MD as PCP - General (Family Medicine)  Eliana Loving MD as Consulting Physician (Radiation Oncology)  Ervin Myers MD as Referring Physician (Breast Surgery)  Noemi Li MD as Consulting Physician (Hematology and Oncology)  Shy Durant DO (Obstetrics and Gynecology)  Frantz Krishna MD (Gastroenterology)      "

## 2019-09-06 PROCEDURE — 77427 RADIATION TX MANAGEMENT X5: CPT | Performed by: RADIOLOGY

## 2019-09-06 PROCEDURE — 77412 RADIATION TX DELIVERY LVL 3: CPT | Performed by: RADIOLOGY

## 2019-09-06 PROCEDURE — 77387 GUIDANCE FOR RADJ TX DLVR: CPT | Performed by: RADIOLOGY

## 2019-09-08 PROCEDURE — 77336 RADIATION PHYSICS CONSULT: CPT | Performed by: RADIOLOGY

## 2019-09-09 PROCEDURE — 77412 RADIATION TX DELIVERY LVL 3: CPT | Performed by: RADIOLOGY

## 2019-09-09 PROCEDURE — 77387 GUIDANCE FOR RADJ TX DLVR: CPT | Performed by: RADIOLOGY

## 2019-09-10 PROCEDURE — 77412 RADIATION TX DELIVERY LVL 3: CPT | Performed by: RADIOLOGY

## 2019-09-10 PROCEDURE — 77387 GUIDANCE FOR RADJ TX DLVR: CPT | Performed by: RADIOLOGY

## 2019-09-11 PROCEDURE — 77412 RADIATION TX DELIVERY LVL 3: CPT | Performed by: RADIOLOGY

## 2019-09-11 PROCEDURE — 77387 GUIDANCE FOR RADJ TX DLVR: CPT | Performed by: RADIOLOGY

## 2019-09-12 ENCOUNTER — RADIATION ONCOLOGY WEEKLY ASSESSMENT (OUTPATIENT)
Dept: RADIATION ONCOLOGY | Facility: HOSPITAL | Age: 54
End: 2019-09-12

## 2019-09-12 ENCOUNTER — HOSPITAL ENCOUNTER (OUTPATIENT)
Dept: MAMMOGRAPHY | Facility: HOSPITAL | Age: 54
Discharge: HOME | End: 2019-09-12
Attending: INTERNAL MEDICINE

## 2019-09-12 VITALS
DIASTOLIC BLOOD PRESSURE: 74 MMHG | OXYGEN SATURATION: 100 % | HEART RATE: 78 BPM | BODY MASS INDEX: 22.03 KG/M2 | WEIGHT: 132.4 LBS | SYSTOLIC BLOOD PRESSURE: 116 MMHG

## 2019-09-12 DIAGNOSIS — D05.11 DUCTAL CARCINOMA IN SITU (DCIS) OF RIGHT BREAST: Primary | ICD-10-CM

## 2019-09-12 PROCEDURE — 77387 GUIDANCE FOR RADJ TX DLVR: CPT | Performed by: RADIOLOGY

## 2019-09-12 PROCEDURE — 77412 RADIATION TX DELIVERY LVL 3: CPT | Performed by: RADIOLOGY

## 2019-09-12 NOTE — PROGRESS NOTES
Physician Weekly Management Note    Diagnosis:     1. Ductal carcinoma in situ (DCIS) of right breast     Stage 0    Reason for Visit: Radiation (10/16)    Concurrent Chemo:   No    Notes on Treatment course, Films, Medical progress and Plan:  Doing well. No problems - No questions today, will miss tomorrow due to work commitment, resume on Monday. cont on.    Performance Status: (1) Restricted in physically strenuous activity, ambulatory and able to do work of light nature    ROS - Other than as listed above, as follow:  Constitutional - Normal - Denies lack of appetite, fatigue, fever, night sweats and change in weight.  Neck - Normal - Denies neck masses, muscle weakness, neck pain, decreased range of motion and swelling of the neck.  Breasts - Normal - Denies breast masses, nipple discharge, nipple inversion and pain.  Respiratory - Normal - Denies cough, dyspnea, hemoptysis, hiccoughs, pleuritic chest pain and wheezing.  Gastrointestinal - Normal - Denies abdominal pain, constipation, diarrhea, heartburn / dyspepsia, hematemesis, hemorrhoids, melena / GI bleeding, nausea, pain / cramping, satiety and vomiting.  Musculoskeletal - Normal - Denies arthritis, bone pain, joint pain, muscle weakness and decreased range of motion.   Hematologic/Lymphatic - Normal - Denies easy bruising and tender or enlarged lymph nodes.    PYHSICAL EXAM - Other than listed above, as follows:  Vitals:    09/12/19 1414   BP: 116/74   Pulse: 78   SpO2: 100%   Weight: 60.1 kg (132 lb 6.4 oz)   PainSc: 0-No pain       Constitutional - Normal - No evidence of impaired alertness, inadequate appearance, premature or advanced chronologic age, uncooperativeness, altered mood and affect and disorientation.  Neck - Normal - No evidence of tender or enlarged lymph nodes, neck abnormalities, restricted range of motion and enlarged thyroid gland.  Breasts - Normal - No change in nipple or incision site.  Chest - Normal - No evidence of chest  "abnormalities and tender or enlarged lymph nodes.  Hematologic/Lymphatic -  Normal - No evidence of tender or enlarged axillae lymph nodes and tender or enlarged neck lymph nodes.    Problem added:  No problems updated.  Medications added: No orders of the defined types were placed in this encounter.    Ancillary referrals made: No orders of the defined types were placed in this encounter.      Technical aspects reviewed:  Weekly OBI approved if applicable? Yes  Weekly port films approved?   Yes  Change requests noted if applicable? Yes  Patient setup and plan reviewed?  Yes    Chart Reviewed:  Continue current treatment plan?   Yes  Treatment plan change requested?  No    I have reviewed and marked as \"reviewed\" the current medications, allergies and problem list in the patients EMR.    I have reviewed the patient's medical, surgical  history in detail, reviewed any pertinent lab work  and updated the computerized patient record if needed.    Patient's Care Team:  Patient Care Team:  Ari Barba MD as PCP - General (Family Medicine)  Eliana Loving MD as Consulting Physician (Radiation Oncology)  Ervin Myers MD as Referring Physician (Breast Surgery)  Noemi Li MD as Consulting Physician (Hematology and Oncology)  Shy Durant DO (Obstetrics and Gynecology)  Frantz Krishna MD (Gastroenterology)      "

## 2019-09-16 PROCEDURE — 77336 RADIATION PHYSICS CONSULT: CPT | Performed by: RADIOLOGY

## 2019-09-16 PROCEDURE — 77427 RADIATION TX MANAGEMENT X5: CPT | Performed by: RADIOLOGY

## 2019-09-16 PROCEDURE — 77387 GUIDANCE FOR RADJ TX DLVR: CPT | Performed by: RADIOLOGY

## 2019-09-16 PROCEDURE — 77412 RADIATION TX DELIVERY LVL 3: CPT | Performed by: RADIOLOGY

## 2019-09-17 PROCEDURE — 77412 RADIATION TX DELIVERY LVL 3: CPT | Performed by: RADIOLOGY

## 2019-09-17 PROCEDURE — 77387 GUIDANCE FOR RADJ TX DLVR: CPT | Performed by: RADIOLOGY

## 2019-09-18 PROCEDURE — 77387 GUIDANCE FOR RADJ TX DLVR: CPT | Performed by: RADIOLOGY

## 2019-09-18 PROCEDURE — 77412 RADIATION TX DELIVERY LVL 3: CPT | Performed by: RADIOLOGY

## 2019-09-19 ENCOUNTER — RADIATION ONCOLOGY WEEKLY ASSESSMENT (OUTPATIENT)
Dept: RADIATION ONCOLOGY | Facility: HOSPITAL | Age: 54
End: 2019-09-19

## 2019-09-19 VITALS
WEIGHT: 134 LBS | HEART RATE: 74 BPM | OXYGEN SATURATION: 100 % | BODY MASS INDEX: 22.3 KG/M2 | SYSTOLIC BLOOD PRESSURE: 107 MMHG | DIASTOLIC BLOOD PRESSURE: 69 MMHG

## 2019-09-19 DIAGNOSIS — D05.11 DUCTAL CARCINOMA IN SITU (DCIS) OF RIGHT BREAST: Primary | ICD-10-CM

## 2019-09-19 PROCEDURE — 77412 RADIATION TX DELIVERY LVL 3: CPT | Performed by: RADIOLOGY

## 2019-09-19 PROCEDURE — 77387 GUIDANCE FOR RADJ TX DLVR: CPT | Performed by: RADIOLOGY

## 2019-09-19 NOTE — PROGRESS NOTES
Physician Weekly Management Note    Diagnosis:     1. Ductal carcinoma in situ (DCIS) of right breast     Stage 0    Reason for Visit: Radiation (14/16)    Concurrent Chemo:   No    Notes on Treatment course, Films, Medical progress and Plan:  Doing well. No problems - will see back in 4-6 weeks. cont on.    Performance Status: (1) Restricted in physically strenuous activity, ambulatory and able to do work of light nature    ROS - Other than as listed above, as follow:  Constitutional - Normal - Denies lack of appetite, fatigue, fever, night sweats and change in weight.  Neck - Normal - Denies neck masses, muscle weakness, neck pain, decreased range of motion and swelling of the neck.  Breasts - Normal - Denies breast masses, nipple discharge, nipple inversion and pain.  Respiratory - Normal - Denies cough, dyspnea, hemoptysis, hiccoughs, pleuritic chest pain and wheezing.  Gastrointestinal - Normal - Denies abdominal pain, constipation, diarrhea, heartburn / dyspepsia, hematemesis, hemorrhoids, melena / GI bleeding, nausea, pain / cramping, satiety and vomiting.  Musculoskeletal - Normal - Denies arthritis, bone pain, joint pain, muscle weakness and decreased range of motion.   Hematologic/Lymphatic - Normal - Denies easy bruising and tender or enlarged lymph nodes.    PYHSICAL EXAM - Other than listed above, as follows:  Vitals:    09/19/19 1418   BP: 107/69   Pulse: 74   SpO2: 100%   Weight: 60.8 kg (134 lb)   PainSc: 0-No pain       Constitutional - Normal - No evidence of impaired alertness, inadequate appearance, premature or advanced chronologic age, uncooperativeness, altered mood and affect and disorientation.  Neck - Normal - No evidence of tender or enlarged lymph nodes, neck abnormalities, restricted range of motion and enlarged thyroid gland.  Breasts - Normal - No change in nipple or incision site.  Chest - Normal - No evidence of chest abnormalities and tender or enlarged lymph  "nodes.  Hematologic/Lymphatic -  Normal - No evidence of tender or enlarged axillae lymph nodes and tender or enlarged neck lymph nodes.    Problem added:  No problems updated.  Medications added: No orders of the defined types were placed in this encounter.    Ancillary referrals made: No orders of the defined types were placed in this encounter.      Technical aspects reviewed:  Weekly OBI approved if applicable? Yes  Weekly port films approved?   Yes  Change requests noted if applicable? Yes  Patient setup and plan reviewed?  Yes    Chart Reviewed:  Continue current treatment plan?   Yes  Treatment plan change requested?  No    I have reviewed and marked as \"reviewed\" the current medications, allergies and problem list in the patients EMR.    I have reviewed the patient's medical, surgical  history in detail, reviewed any pertinent lab work  and updated the computerized patient record if needed.    Patient's Care Team:  Patient Care Team:  Ari Barba MD as PCP - General (Family Medicine)  Eliana Loving MD as Consulting Physician (Radiation Oncology)  Ervin Myers MD as Referring Physician (Breast Surgery)  Noemi Li MD as Consulting Physician (Hematology and Oncology)  Shy Durant DO (Obstetrics and Gynecology)  Frantz Krishna MD (Gastroenterology)      "

## 2019-09-20 PROCEDURE — 77387 GUIDANCE FOR RADJ TX DLVR: CPT | Performed by: RADIOLOGY

## 2019-09-20 PROCEDURE — 77412 RADIATION TX DELIVERY LVL 3: CPT | Performed by: RADIOLOGY

## 2019-09-23 PROCEDURE — 77387 GUIDANCE FOR RADJ TX DLVR: CPT | Performed by: RADIOLOGY

## 2019-09-23 PROCEDURE — 77412 RADIATION TX DELIVERY LVL 3: CPT | Performed by: RADIOLOGY

## 2019-09-30 ENCOUNTER — DOCUMENTATION (OUTPATIENT)
Dept: RADIATION ONCOLOGY | Facility: HOSPITAL | Age: 54
End: 2019-09-30

## 2019-10-08 ENCOUNTER — CLINICAL SUPPORT (OUTPATIENT)
Dept: OTHER | Facility: HOSPITAL | Age: 54
End: 2019-10-08

## 2019-10-08 DIAGNOSIS — D05.11 DUCTAL CARCINOMA IN SITU (DCIS) OF RIGHT BREAST: Primary | ICD-10-CM

## 2019-10-08 DIAGNOSIS — Z80.8 FAMILY HISTORY OF OTHER SPECIFIED MALIGNANT NEOPLASM: ICD-10-CM

## 2019-10-08 PROCEDURE — G0463 HOSPITAL OUTPT CLINIC VISIT: HCPCS

## 2019-10-08 NOTE — PATIENT INSTRUCTIONS
Pt seen by Dr. Voss for genetic counseling and testing. Lab drawn with 21g butterfly needle in Right AC x 1 attempt. Sent to [facility]. Pt informed she would receive a phone call when test results.

## 2019-10-29 ENCOUNTER — OFFICE VISIT (OUTPATIENT)
Dept: RADIATION ONCOLOGY | Facility: HOSPITAL | Age: 54
End: 2019-10-29

## 2019-10-29 VITALS — OXYGEN SATURATION: 99 % | SYSTOLIC BLOOD PRESSURE: 117 MMHG | DIASTOLIC BLOOD PRESSURE: 67 MMHG | HEART RATE: 68 BPM

## 2019-10-29 DIAGNOSIS — D05.11 DUCTAL CARCINOMA IN SITU (DCIS) OF RIGHT BREAST: Primary | ICD-10-CM

## 2019-10-29 PROCEDURE — 99024 POSTOP FOLLOW-UP VISIT: CPT | Performed by: RADIOLOGY

## 2019-10-29 NOTE — PROGRESS NOTES
"DIAGNOSIS and REASON FOR FOLLOW UP:   1. Ductal carcinoma in situ (DCIS) of right breast      Patient Care Team:  Ari Barba MD as PCP - General (Family Medicine)  Eliana Loving MD as Consulting Physician (Radiation Oncology)  Ervin Myers MD as Referring Physician (Breast Surgery)  Noemi Li MD as Consulting Physician (Hematology and Oncology)  Shy Durant DO (Obstetrics and Gynecology)  Frantz Krishna MD (Gastroenterology)    CHIEF COMPLAINT:  4 week follow up  I had the pleasure of seeing Jemma Inman back in the department today, now approximately 4 weeks out from the radiation therapy portion of her treatment for the above mentioned diagnosis. Clinically she is doing wonderfully well.  Her energy has improved and she states her performance status is nearly back to baseline.  She has no new complaints regarding the breast.  She is feeling well and without complaints. She commented on how \"unbelievably easy the radiation was\".    Past Medical History: she  has a past medical history of Anxiety, Breast cancer (CMS/HCC), History of irregular heartbeat, IBS (irritable bowel syndrome), Lower back pain, PONV (postoperative nausea and vomiting), and Tinnitus.    Past Surgical History:  she has a past surgical history that includes Breast biopsy and Breast lumpectomy (Right, 7/24/2019).    Meds:    Current Outpatient Medications:   •  aspirin 81 MG chewable tablet, Chew 81 mg Daily., Disp: , Rfl:   •  mirtazapine (REMERON) 15 MG tablet, Take 15 mg by mouth Every Night., Disp: , Rfl: 1    Allergies:  No Known Allergies    Family History:  her family history includes Brain cancer in her mother; Breast cancer in her paternal aunt and paternal aunt; Cancer in her paternal grandfather; Colon cancer in her paternal aunt; Depression in her mother; Prostate cancer in her father, paternal uncle, and paternal uncle; Throat cancer in her paternal uncle.    Social History:  she  reports that she " quit smoking about 11 years ago. Her smoking use included cigarettes. She has a 15.00 pack-year smoking history. She has never used smokeless tobacco. She reports that she does not drink alcohol or use drugs.    Pertinent Findings on Review of Systems:  Fourteen systems have been reviewed with the patient and are negative other than as mentioned above.    Pertinent Findings on Physical Exam:  Vitals:    10/29/19 0937   BP: 117/67   Pulse: 68   SpO2: 99%   PainSc: 0-No pain       Performance Status:  (1) Restricted in physically strenuous activity, ambulatory and able to do work of light nature  Physical examination of the left breast reveals no abnormality and the left axilla is benign, without lymphadenopathy.     The right breast shows the well-healed lumpectomy incision and only slight and mild hyperpigmentation from the radiation therapy. The breast is quite soft and easy to examine. There are no worrisome nodules appreciated. The nipple is without change.     The axilla is benign on the right and there is no cervical or supraclavicular lymphadenopathy.     There is no lymphedema noted in either upper extremity.    Assessment:  DCIS of Right breast, Stage 0.  Doing wonderfully well, 4 weeks out from radiation therapy    Plan:   We reviewed today the current NCCN guidelines for her surveillance and follow up, specifically the need for physician visit every 4 to 6 months for 5 years, annual mammogram, annual gynecologic assessment, continued monitoring of bone health and achieving and maintaining an ideal BMI.      She has not started the hormonal therapy yet and wants to discuss that further with Dr. Li. She returns to see Dr. Myers on November 1st and the [facility] physicians on November 12th and and understands she will continue follow-up through that office while on the medication. Regarding her mammographic follow-up, she knows to continue on with her annual bilateral mammogram in early May, 2020. She has  those done at [facility].  Given the above, I have not given her an appointment to return here, but encouraged her to call if I can be of any further help in her care and thank you again for allowing us to participate in her care.

## 2019-11-01 ENCOUNTER — OFFICE VISIT (OUTPATIENT)
Dept: MAMMOGRAPHY | Facility: CLINIC | Age: 54
End: 2019-11-01

## 2019-11-01 VITALS — DIASTOLIC BLOOD PRESSURE: 60 MMHG | SYSTOLIC BLOOD PRESSURE: 110 MMHG

## 2019-11-01 DIAGNOSIS — D05.11 DUCTAL CARCINOMA IN SITU (DCIS) OF RIGHT BREAST: Primary | ICD-10-CM

## 2019-11-01 PROCEDURE — 99024 POSTOP FOLLOW-UP VISIT: CPT | Performed by: SURGERY

## 2019-11-01 NOTE — PROGRESS NOTES
Chief Complaint: Jemma Inman is a  54 y.o. female, initially referred by No ref. provider found , who is here today for a postoperative visit.    History of Present Illness:  In the interim,Jemma Inman has completed her radiation treatment.  She tolerated it well and has also seen the medical oncologist but no decision has been made yet on hormone blocking therapy.    She has noted no redness, warmth,drainage, swelling at the incision site. Denies fever or chills.      Current Outpatient Medications:   •  aspirin 81 MG chewable tablet, Chew 81 mg Daily., Disp: , Rfl:   •  mirtazapine (REMERON) 15 MG tablet, Take 15 mg by mouth Every Night., Disp: , Rfl: 1  Physical examination  Right breast-the lumpectomy scar in the lateral aspect of the breast is healing nicely with no significant architectural distortion and I do not palpate any other worrisome masses in the breast.  Assessment:  DCIS right breast status post lumpectomy.  She is recovering nicely and will continue to follow with the medical oncologist.    Plan:  I will see her back on an as-needed basis.          EMR Dragon/transcription disclaimer:    Much of this encounter note is an electronic transcription/translocation of spoken language to printed text.  The electronic translation of spoken language may permit erroneous, or at times, nonsensical words or phrases to be inadvertently transcribed.  Although I have reviewed the note from such areas, some may still exist.

## 2019-11-12 ENCOUNTER — LAB (OUTPATIENT)
Dept: LAB | Facility: HOSPITAL | Age: 54
End: 2019-11-12

## 2019-11-12 ENCOUNTER — OFFICE VISIT (OUTPATIENT)
Dept: ONCOLOGY | Facility: CLINIC | Age: 54
End: 2019-11-12

## 2019-11-12 VITALS
TEMPERATURE: 98.4 F | RESPIRATION RATE: 16 BRPM | HEART RATE: 75 BPM | WEIGHT: 133.2 LBS | SYSTOLIC BLOOD PRESSURE: 117 MMHG | DIASTOLIC BLOOD PRESSURE: 64 MMHG | HEIGHT: 65 IN | OXYGEN SATURATION: 99 % | BODY MASS INDEX: 22.19 KG/M2

## 2019-11-12 DIAGNOSIS — D05.11 DUCTAL CARCINOMA IN SITU (DCIS) OF RIGHT BREAST: Primary | ICD-10-CM

## 2019-11-12 DIAGNOSIS — D05.11 DUCTAL CARCINOMA IN SITU (DCIS) OF RIGHT BREAST: ICD-10-CM

## 2019-11-12 LAB
ALBUMIN SERPL-MCNC: 4.1 G/DL (ref 3.5–5.2)
ALBUMIN/GLOB SERPL: 1.2 G/DL (ref 1.1–2.4)
ALP SERPL-CCNC: 108 U/L (ref 38–116)
ALT SERPL W P-5'-P-CCNC: 21 U/L (ref 0–33)
ANION GAP SERPL CALCULATED.3IONS-SCNC: 10.2 MMOL/L (ref 5–15)
AST SERPL-CCNC: 24 U/L (ref 0–32)
BASOPHILS # BLD AUTO: 0.07 10*3/MM3 (ref 0–0.2)
BASOPHILS NFR BLD AUTO: 1.1 % (ref 0–1.5)
BILIRUB SERPL-MCNC: 0.5 MG/DL (ref 0.2–1.2)
BUN BLD-MCNC: 13 MG/DL (ref 6–20)
BUN/CREAT SERPL: 15.5 (ref 7.3–30)
CALCIUM SPEC-SCNC: 9.3 MG/DL (ref 8.5–10.2)
CHLORIDE SERPL-SCNC: 103 MMOL/L (ref 98–107)
CO2 SERPL-SCNC: 29.8 MMOL/L (ref 22–29)
CREAT BLD-MCNC: 0.84 MG/DL (ref 0.6–1.1)
DEPRECATED RDW RBC AUTO: 46.2 FL (ref 37–54)
EOSINOPHIL # BLD AUTO: 0.46 10*3/MM3 (ref 0–0.4)
EOSINOPHIL NFR BLD AUTO: 7.3 % (ref 0.3–6.2)
ERYTHROCYTE [DISTWIDTH] IN BLOOD BY AUTOMATED COUNT: 12.6 % (ref 12.3–15.4)
GFR SERPL CREATININE-BSD FRML MDRD: 70 ML/MIN/1.73
GLOBULIN UR ELPH-MCNC: 3.3 GM/DL (ref 1.8–3.5)
GLUCOSE BLD-MCNC: 84 MG/DL (ref 74–124)
HCT VFR BLD AUTO: 40.2 % (ref 34–46.6)
HGB BLD-MCNC: 12.6 G/DL (ref 12–15.9)
IMM GRANULOCYTES # BLD AUTO: 0.02 10*3/MM3 (ref 0–0.05)
IMM GRANULOCYTES NFR BLD AUTO: 0.3 % (ref 0–0.5)
LYMPHOCYTES # BLD AUTO: 1.91 10*3/MM3 (ref 0.7–3.1)
LYMPHOCYTES NFR BLD AUTO: 30.4 % (ref 19.6–45.3)
MCH RBC QN AUTO: 31 PG (ref 26.6–33)
MCHC RBC AUTO-ENTMCNC: 31.3 G/DL (ref 31.5–35.7)
MCV RBC AUTO: 99 FL (ref 79–97)
MONOCYTES # BLD AUTO: 0.69 10*3/MM3 (ref 0.1–0.9)
MONOCYTES NFR BLD AUTO: 11 % (ref 5–12)
NEUTROPHILS # BLD AUTO: 3.13 10*3/MM3 (ref 1.7–7)
NEUTROPHILS NFR BLD AUTO: 49.9 % (ref 42.7–76)
NRBC BLD AUTO-RTO: 0 /100 WBC (ref 0–0.2)
PLATELET # BLD AUTO: 218 10*3/MM3 (ref 140–450)
PMV BLD AUTO: 10.6 FL (ref 6–12)
POTASSIUM BLD-SCNC: 4.6 MMOL/L (ref 3.5–4.7)
PROT SERPL-MCNC: 7.4 G/DL (ref 6.3–8)
RBC # BLD AUTO: 4.06 10*6/MM3 (ref 3.77–5.28)
SODIUM BLD-SCNC: 143 MMOL/L (ref 134–145)
WBC NRBC COR # BLD: 6.28 10*3/MM3 (ref 3.4–10.8)

## 2019-11-12 PROCEDURE — G0463 HOSPITAL OUTPT CLINIC VISIT: HCPCS | Performed by: INTERNAL MEDICINE

## 2019-11-12 PROCEDURE — 85025 COMPLETE CBC W/AUTO DIFF WBC: CPT

## 2019-11-12 PROCEDURE — 99214 OFFICE O/P EST MOD 30 MIN: CPT | Performed by: INTERNAL MEDICINE

## 2019-11-12 PROCEDURE — 36415 COLL VENOUS BLD VENIPUNCTURE: CPT

## 2019-11-12 PROCEDURE — 80053 COMPREHEN METABOLIC PANEL: CPT

## 2019-11-12 NOTE — PROGRESS NOTES
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  She is here today to discuss prevention.  She had a bone density done which is essentially normal except for mild osteopenia in the hip    Because of her family history genetic testing was done and thankfully the extended profile was completely negative    We discussed the data regarding the use of low-dose tamoxifen and she will consider this encouraged her IBS makes it very hard for her to tolerate most medications    We discussed the use of 10 to 5 mg of tamoxifen for 5 years and she will research this before she returns in 6 months    Next mammogram is due in May 2020    Past Medical History:   Diagnosis Date   • Anxiety    • Breast cancer (CMS/HCC)     RIGHT    • History of irregular heartbeat    • IBS (irritable bowel syndrome)    • Lower back pain    • PONV (postoperative nausea and vomiting)    • Tinnitus         Past Surgical History:   Procedure Laterality Date   • BREAST BIOPSY      1992 Left-benign 2019 Right-malignant   • BREAST LUMPECTOMY Right 7/24/2019    Procedure: RIGHT BREAST LUMPECTOMY WITH NEEDLE LOCALIZATION;  Surgeon: Ervin Myers MD;  Location: [facility];  Service: General      ONC HISTORY  patient is a 54-year-old female with long history of irritable bowel syndrome who was noted on her routine 3D mammogram this year to have an faint indistinct microcalcifications in the right breast that was not seen a year previously.  This led to a diagnostic mammogram and stereotactic biopsy on 5/31/2019 which revealed Intermediate grade DCIS cribriform type with focal necrosis measuring 4mm with a minute  intraductal papilloma % TX 12% .  Patient was referred to Dr. Myers and underwent needle localization and lumpectomy on 2019 with the findings of a 1.6 mm area of intermediate grade DCIS.  Margins were close and reexcised in the superior margin there with an additional 0.5 mm area of micropapillary DCIS again with a clear margin although only 1.5 mm.  She has done well postoperatively and is here to discuss any additional treatment options  Patient reports a cyst biopsied in her left breast which was benign in     She is  1 para 1 menarche was at age 11 menopause at 48 she is been on no hormone replacement therapy first childbirth was at age 20 she did not breast-feed  Family history is positive for father diagnosed with prostate cancer at age 60  at 76 of metastatic prostate cancer mother had a brain cancer and  at age 65 she has 1 brother and 2 sisters were healthy she has 2 paternal aunts with breast cancer in her 60s a paternal aunt with colon cancer in her 60s and 3 paternal uncles with prostate cancer in her 60s for a total of 7 out of 11 siblings on her father's side with malignancy    Genetic testing completely negative    Current Outpatient Medications on File Prior to Visit   Medication Sig Dispense Refill   • aspirin 81 MG chewable tablet Chew 81 mg Daily.     • mirtazapine (REMERON) 15 MG tablet Take 15 mg by mouth Every Night.  1     No current facility-administered medications on file prior to visit.         ALLERGIES:  No Known Allergies     Social History     Socioeconomic History   • Marital status:      Spouse name: Edwige   • Number of children: 1   • Years of education: College   • Highest education level: Not on file   Occupational History   • Occupation: HR     Employer: [employer]   Tobacco Use   • Smoking status: Former Smoker     Packs/day: 0.50     Years: 30.00     Pack years: 15.00     Types: Cigarettes     Last attempt to quit:  2008     Years since quittin.8   • Smokeless tobacco: Never Used   Substance and Sexual Activity   • Alcohol use: No     Frequency: Never   • Drug use: No   • Sexual activity: No        Family History   Problem Relation Age of Onset   • Depression Mother    • Brain cancer Mother    • Prostate cancer Father    • Breast cancer Paternal Aunt    • Prostate cancer Paternal Uncle    • Cancer Paternal Grandfather         eye   • Breast cancer Paternal Aunt    • Colon cancer Paternal Aunt    • Prostate cancer Paternal Uncle    • Throat cancer Paternal Uncle    • Malig Hyperthermia Neg Hx         Review of Systems   Constitutional: Positive for unexpected weight change (Weight gain and weight loss due to IBS lost 4 lbs ). Negative for fatigue.   Respiratory: Negative for chest tightness and shortness of breath.    Cardiovascular: Negative for chest pain.   Gastrointestinal: Positive for constipation (worse 19) and diarrhea ( ibs worse 19). Negative for nausea and vomiting.   Musculoskeletal: Positive for back pain (Chronic same 19).        Objective     There were no vitals filed for this visit.  Current Status 2019   ECOG score 0       Physical Exam   Pulmonary/Chest:           GENERAL:  Well-developed, well-nourished in no acute distress.   SKIN:  Warm, dry without rashes, purpura or petechiae.  EYES:  Pupils equal, round and reactive to light.  EOMs intact.  Conjunctivae normal.  EARS:  Hearing intact.  NOSE:  Septum midline.  No excoriations or nasal discharge.  MOUTH:  Tongue is well-papillated; no stomatitis or ulcers.  Lips normal.  THROAT:  Oropharynx without lesions or exudates.  NECK:  Supple with good range of motion; no thyromegaly or masses, no JVD.  LYMPHATICS:  No cervical, supraclavicular, axillary or inguinal adenopathy.  CHEST:  Lungs clear to auscultation. Good airflow.  BREASTS: Right breast shows a well-healed lumpectomy scar   , left breast shows an old biopsy scar in the  upper quadrant no masses noted  CARDIAC:  Regular rate and rhythm without murmurs, rubs or gallops. Normal S1,S2.  ABDOMEN:  Soft, nontender with no hepatosplenomegaly or masses.  EXTREMITIES:  No clubbing, cyanosis or edema.  Ankles slightly puffy  NEUROLOGICAL:  Cranial Nerves II-XII grossly intact.  No focal neurological deficits.  PSYCHIATRIC:  Normal affect and mood.        RECENT LABS:  Hematology WBC   Date Value Ref Range Status   08/08/2019 9.35 3.40 - 10.80 10*3/mm3 Final     RBC   Date Value Ref Range Status   08/08/2019 4.09 3.77 - 5.28 10*6/mm3 Final     Hemoglobin   Date Value Ref Range Status   08/08/2019 12.7 12.0 - 15.9 g/dL Final     Hematocrit   Date Value Ref Range Status   08/08/2019 38.9 34.0 - 46.6 % Final     Platelets   Date Value Ref Range Status   08/08/2019 234 140 - 450 10*3/mm3 Final                          Tissue Pathology Exam: [id number]   Order: [id number]   Collected:  7/24/2019 12:23 Status:  Final result   Visible to patient:  No (Not Released) Dx:  Ductal carcinoma in situ (DCIS) of ri...   Component    Final Diagnosis   1. Right Breast Lumpectomy:                 A. Ductal carcinoma in-situ (DCIS) with apocrine features.                            1. DCIS spans area estimated at 15 mm x 9 mm x 5 mm in greatest dimensions.                            2. Intermediate nuclear grade with solid, cribriform and micropapillary architecture                                with focal necrosis.               B. No invasive carcinoma identified (see comment).               C. Ductal carcinoma in-situ closely approximates multiple margins and                    DCIS is present at the (Anterior) margin of excision.                     DCIS from remaining margins measures:                            Posterior margin:  1.0 mm.                            Superior margin: 0.4 mm.                            Inferior margin: 5 mm.                            Medial margin 1.0 mm.                             Lateral margin 0.9 mm.                  D.  Microcalcification present in DCIS and benign ductal structures.               E.  Previously reported Biomarkers on DCIS: Estrogen receptor: Positive (100%),                     Progesterone receptor: Positive (12%), Ki-67=15% by outside report (not reviewed).     2. Right Breast, Additional New Lateral margin:               A. No in-situ nor infiltrating carcinoma.               B. New lateral margin is negative for malignancy by 10 mm.     3. Right Breast, Additional New Posterior margin:               A. No in-situ nor infiltrating carcinoma.               B. New posterior margin is negative for malignancy by additional 5 mm.     4. Right Breast,  Additional New Inferior Margin:               A. No in-situ nor infiltrating carcinoma.               B. New inferior margin is negative for malignancy by additional 5 mm.     5. Right Breast, Additional New Medial Margin:               A. No in-situ nor infiltrating carcinoma.               B. New margin is negative for malignancy by additional 10 mm.     6. Right Breast, Additional New Superior Margin:               A. Focal low grade micropapillary ductal carcinoma in situ,  0.5 mm.               B. No infiltrating carcinoma.               C. New margin is negative for malignancy by additional 1.5 mm.     7. Right Breast, Additional New Anterior Margin:               A. No in-situ nor infiltrating carcinoma.               B. New margin is negative for malignancy by additional 6 mm.     Pathologic stage:  pTis (DCIS), NX (intermediate grade DCIS).  See synoptic for tumor details.     Jat/kds            Electronically signed by Guillermo Munguia MD on 7/26/2019                            Assessment/Plan    1. Intermediate grade DCIS  ER ME positive postlumpectomy and radiation  2.  Chronic irritable bowel syndrome  3 positive paternal family history of.  Multiple malignancies-extended genetic testing  negative    Plan  1.  Return in 6  months to discuss prevention    She is very concerned that with her IBS she will not be able to take an oral medication and I presented data about low-dose tamoxifen and a dose of 5 mg based on the recent trial in [place] shows benefit    She realizes that prevention does not affect her survival but just the risk of a another DCIS or invasive cancer in her breasts and I think she is willing to try some medication but has a low threshold for discontinuing especially for IBS worsens and I think this is very reasonable    Follow-up in 6 months has been scheduled

## 2019-12-17 ENCOUNTER — OFFICE VISIT CONVERTED (OUTPATIENT)
Dept: FAMILY MEDICINE CLINIC | Facility: CLINIC | Age: 54
End: 2019-12-17
Attending: FAMILY MEDICINE

## 2019-12-18 ENCOUNTER — HOSPITAL ENCOUNTER (OUTPATIENT)
Dept: GENERAL RADIOLOGY | Facility: HOSPITAL | Age: 54
Discharge: HOME | End: 2019-12-18
Attending: FAMILY MEDICINE

## 2019-12-19 ENCOUNTER — HOSPITAL ENCOUNTER (OUTPATIENT)
Dept: LAB | Facility: HOSPITAL | Age: 54
Discharge: HOME | End: 2019-12-19
Attending: FAMILY MEDICINE

## 2019-12-19 LAB
C DIFF TOX B STL QL CT TISS CULT: NEGATIVE
CONV 027 TOXIN: NEGATIVE

## 2019-12-21 LAB — BACTERIA SPEC AEROBE CULT: NORMAL

## 2019-12-31 ENCOUNTER — OFFICE VISIT CONVERTED (OUTPATIENT)
Dept: FAMILY MEDICINE CLINIC | Facility: CLINIC | Age: 54
End: 2019-12-31
Attending: FAMILY MEDICINE

## 2020-03-03 ENCOUNTER — OFFICE VISIT CONVERTED (OUTPATIENT)
Dept: GASTROENTEROLOGY | Facility: CLINIC | Age: 55
End: 2020-03-03
Attending: INTERNAL MEDICINE

## 2020-03-05 ENCOUNTER — CONVERSION ENCOUNTER (OUTPATIENT)
Dept: FAMILY MEDICINE CLINIC | Facility: CLINIC | Age: 55
End: 2020-03-05

## 2020-03-05 ENCOUNTER — HOSPITAL ENCOUNTER (OUTPATIENT)
Dept: GENERAL RADIOLOGY | Facility: HOSPITAL | Age: 55
Discharge: HOME | End: 2020-03-05
Attending: FAMILY MEDICINE

## 2020-03-05 ENCOUNTER — OFFICE VISIT CONVERTED (OUTPATIENT)
Dept: FAMILY MEDICINE CLINIC | Facility: CLINIC | Age: 55
End: 2020-03-05
Attending: FAMILY MEDICINE

## 2020-03-06 ENCOUNTER — OFFICE VISIT CONVERTED (OUTPATIENT)
Dept: FAMILY MEDICINE CLINIC | Facility: CLINIC | Age: 55
End: 2020-03-06
Attending: FAMILY MEDICINE

## 2020-03-13 ENCOUNTER — HOSPITAL ENCOUNTER (OUTPATIENT)
Dept: CARDIOLOGY | Facility: HOSPITAL | Age: 55
Discharge: HOME | End: 2020-03-13
Attending: FAMILY MEDICINE

## 2020-05-05 ENCOUNTER — OFFICE VISIT CONVERTED (OUTPATIENT)
Dept: FAMILY MEDICINE CLINIC | Facility: CLINIC | Age: 55
End: 2020-05-05
Attending: NURSE PRACTITIONER

## 2020-05-05 ENCOUNTER — HOSPITAL ENCOUNTER (OUTPATIENT)
Dept: FAMILY MEDICINE CLINIC | Facility: CLINIC | Age: 55
Discharge: HOME | End: 2020-05-05
Attending: NURSE PRACTITIONER

## 2020-05-07 LAB
AMOXICILLIN+CLAV SUSC ISLT: <=2
AMPICILLIN SUSC ISLT: <=2
AMPICILLIN+SULBAC SUSC ISLT: <=2
BACTERIA UR CULT: ABNORMAL
CEFAZOLIN SUSC ISLT: <=4
CEFEPIME SUSC ISLT: <=1
CEFTAZIDIME SUSC ISLT: <=1
CEFTRIAXONE SUSC ISLT: <=1
CEFUROXIME ORAL SUSC ISLT: 4
CEFUROXIME PARENTER SUSC ISLT: 4
CIPROFLOXACIN SUSC ISLT: <=0.25
ERTAPENEM SUSC ISLT: <=0.5
GENTAMICIN SUSC ISLT: <=1
LEVOFLOXACIN SUSC ISLT: <=0.12
NITROFURANTOIN SUSC ISLT: <=16
TETRACYCLINE SUSC ISLT: <=1
TMP SMX SUSC ISLT: <=20
TOBRAMYCIN SUSC ISLT: <=1

## 2020-05-19 ENCOUNTER — HOSPITAL ENCOUNTER (OUTPATIENT)
Dept: MAMMOGRAPHY | Facility: HOSPITAL | Age: 55
Discharge: HOME | End: 2020-05-19
Attending: INTERNAL MEDICINE

## 2020-05-21 ENCOUNTER — APPOINTMENT (OUTPATIENT)
Dept: LAB | Facility: HOSPITAL | Age: 55
End: 2020-05-21

## 2020-07-30 ENCOUNTER — TELEPHONE (OUTPATIENT)
Dept: ONCOLOGY | Facility: CLINIC | Age: 55
End: 2020-07-30

## 2020-07-30 NOTE — TELEPHONE ENCOUNTER
PT CALLED TO HAVE LAB ORDERS FAXED TO [facility]. PT DOES NOT HAVE FAX NUMBER ON HAND. SHE SAID SHE WILL CALL BACK WITH THE NUMBER.

## 2020-07-31 NOTE — TELEPHONE ENCOUNTER
[facility].  Pt missed apt with Dr. Li recently.  Explained to patient that she did not have an appointment here and she would need to make an appointment with Dr. Li for [facility] to [facility].  Pt proceeded to state that she was switching to an [facility].  Instructed her that her new oncologist should be the one ordering her lab work.  Pt v/u.

## 2020-10-12 ENCOUNTER — HOSPITAL ENCOUNTER (OUTPATIENT)
Dept: ULTRASOUND IMAGING | Facility: HOSPITAL | Age: 55
Discharge: HOME | End: 2020-10-12
Attending: INTERNAL MEDICINE

## 2020-11-30 ENCOUNTER — HOSPITAL ENCOUNTER (OUTPATIENT)
Dept: PHYSICAL THERAPY | Facility: CLINIC | Age: 55
Setting detail: RECURRING SERIES
Discharge: HOME | End: 2021-02-26

## 2021-04-23 ENCOUNTER — HOSPITAL ENCOUNTER (OUTPATIENT)
Dept: MAMMOGRAPHY | Facility: HOSPITAL | Age: 56
Discharge: HOME | End: 2021-04-23
Attending: INTERNAL MEDICINE

## 2021-05-13 NOTE — PROGRESS NOTES
Progress Note      Patient Name: Jemma Inman   Patient ID: [id number]   Sex: Female   YOB: 1965    Primary Care Provider: Ari Barba MD    Visit Date: May 5, 2020    Provider: JOSE Addison   Location: [facility]   Location Address: [address] [place] [address], [address] [place] [address]  [id number]   Location Phone: [phone]          Chief Complaint  · UTI  · woke up this morning with blood in urine and pressure      History Of Present Illness  Jemma Inman is a 55 year old /White female who presents for evaluation and treatment of: pt had UTI's before and know how she feels. woke up this am with discomfort and noted blood in her urine. last evening she felt something was different       Past Medical History  Disease Name Date Onset Notes   Allergic rhinitis --  --    Anemia --  --    Anemia, Unspecified --  --    Anxiety --  --    Arthritis --  --    Arthritis, rheumatoid --  --    Asthma --  --    Deafness --  --    Depression --  --    Heart Murmur --  --    Hematuria 04/24/2017 --    Hemorrhoids --  --    Night sweats --  --    Rectal bleeding --  --    Renal mass --  --    Sinus Trouble; unspecified --  --    Thyroid Problems --  --          Past Surgical History  Procedure Name Date Notes   Colonoscopy 2002 2014 --    Cystoscopy May, 2017 office/[facility]   EGD 2014 --    Lumpectomy, left breast 1990 --          Medication List  Name Date Started Instructions   Remeron 15 mg oral tablet 12/31/2019 TAKE 1 TABLET BY ORAL ROUTE DAILY FOR 90 DAYS         Allergy List  Allergen Name Date Reaction Notes   NO KNOWN DRUG ALLERGIES --  --  --          Family Medical History  Disease Name Relative/Age Notes   Colon Neoplasm, Malignant Aunt/   *Paternal Aunt   Brain Neoplasm, Malignant  --    Stroke  --    Cancer, Unspecified Father/  Mother/   Mother; Father   Prostate cancer  --          Social History  Finding Status Start/Stop Quantity Notes   Alcohol Former --/-- --   "--    Alcohol Use Never --/-- --  does not drink   lives with spouse --  --/-- --  --    . --  --/-- --  --    Recreational Drug Use Never --/-- --  no   Tobacco Former --/-- --  former smoker   Working --  --/-- --  --          Immunizations  NameDate Admin Mfg Trade Name Lot Number Route Inj VIS Given VIS Publication   [id number]/2019 [facility] Fluzone Quadrivalent [id number] IM [place] 12/17/2019    Comments:          Review of Systems  · Constitutional  o Denies  o : fatigue, night sweats  · Eyes  o Denies  o : double vision, blurred vision  · HENT  o Denies  o : vertigo, recent head injury  · Cardiovascular  o Denies  o : chest pain, irregular heart beats  · Respiratory  o Denies  o : shortness of breath, productive cough  · Gastrointestinal  o Denies  o : nausea, vomiting  · Genitourinary  o Admits  o : dysuria, blood in urine  o Denies  o : urinary retention, flank pain  · Integument  o Denies  o : hair growth change, new skin lesions  · Neurologic  o Denies  o : altered mental status, seizures  · Musculoskeletal  o Denies  o : joint swelling, limitation of motion  · Endocrine  o Denies  o : cold intolerance, heat intolerance  · Heme-Lymph  o Denies  o : petechiae, lymph node enlargement or tenderness  · Allergic-Immunologic  o Denies  o : frequent illnesses      Vitals  Date Time BP Position Site L\R Cuff Size HR RR TEMP (F) WT  HT  BMI kg/m2 BSA m2 O2 Sat        05/05/2020 09:44 /65 Sitting    90 - R 25 97.6 129lbs 2oz 5'  5\" 21.49 1.64 98 %          Physical Examination  · Constitutional  o Appearance  o : well-nourished, well developed, alert, in no acute distress  · Respiratory  o Respiratory Effort  o : breathing unlabored  o Inspection of Chest  o : normal appearance, no retractions  o Auscultation of Lungs  o : normal breath sounds throughout  · Cardiovascular  o Heart  o :   § Auscultation of Heart  § : regular rate and rhythm without murmur  · Musculoskeletal  o General  o :   § General " Musculoskeletal  § : No joint swelling or deformity., Muscle tone, strength, and development grossly normal.  · Skin and Subcutaneous Tissue  o General Inspection  o : no rashes or lesions present, no areas of discoloration          Results  · In-Office Procedures  o Lab procedure  § IOP - Urinalysis without Microscopy (Clinitek) [facility] ([id number])   § Color Ur: Orange   § Clarity Ur: Cloudy   § Glucose Ur Ql Strip: Negative   § Bilirub Ur Ql Strip: Negative   § Ketones Ur Ql Strip: Negative   § Sp Gr Ur Qn: 1.025   § Hgb Ur Ql Strip: Large   § pH Ur-LsCnc: 6.5   § Prot Ur Ql Strip: 30 mg/dL   § Urobilinogen Ur Strip-mCnc: 0.2 E.U./dL   § Nitrite Ur Ql Strip: Negative   § WBC Est Ur Ql Strip: Moderate       Assessment  · Urinary tract infection     599.0/N39.0  · Hematuria     599.70/R31.9      Plan  · Orders  o Urine culture (56420, [id number]) - 599.70/R31.9 - 05/05/2020  o ACO-39: Current medications updated and reviewed () - - 05/05/2020  o Urinalysis with Reflex Microscopy if abnormal ([facility]) ([id number]) - 599.70/R31.9 - 05/12/2020  · Medications  o Cipro 500 mg oral tablet   SIG: take 1 tablet (500 mg) by oral route every 12 hours for 7 days   DISP: (14) tablets with 0 refills  Prescribed on 05/05/2020     o Medrol (Willie) 4 mg oral tablets,dose pack   SIG: take by oral route as directed per package instructions   DISP: (1) 21 ct dose-pack with 0 refills  Discontinued on 05/05/2020     o Medications have been Reconciled  o Transition of Care or Provider Policy  · Instructions  o Rest. Increase Fluids.  o Patient was educated/instructed on their diagnosis, treatment and medications prior to discharge from the clinic today.  o recheck urine after antibiotics are finished due to hematuria  · Disposition  o Call or Return if symptoms worsen or persist.            Electronically Signed by: Hyun Vigil APRN -Author on May 5, 2020 10:13:29 AM

## 2021-05-15 VITALS
WEIGHT: 133.25 LBS | HEART RATE: 68 BPM | DIASTOLIC BLOOD PRESSURE: 62 MMHG | HEIGHT: 65 IN | BODY MASS INDEX: 22.2 KG/M2 | OXYGEN SATURATION: 97 % | TEMPERATURE: 97.7 F | SYSTOLIC BLOOD PRESSURE: 106 MMHG

## 2021-05-15 VITALS
BODY MASS INDEX: 21.69 KG/M2 | HEART RATE: 85 BPM | TEMPERATURE: 97.6 F | DIASTOLIC BLOOD PRESSURE: 65 MMHG | HEIGHT: 65 IN | WEIGHT: 130.19 LBS | SYSTOLIC BLOOD PRESSURE: 105 MMHG | OXYGEN SATURATION: 99 %

## 2021-05-15 VITALS
TEMPERATURE: 98.7 F | SYSTOLIC BLOOD PRESSURE: 108 MMHG | DIASTOLIC BLOOD PRESSURE: 52 MMHG | OXYGEN SATURATION: 98 % | WEIGHT: 130 LBS | HEIGHT: 65 IN | BODY MASS INDEX: 21.66 KG/M2 | HEART RATE: 106 BPM

## 2021-05-15 VITALS — BODY MASS INDEX: 23.32 KG/M2 | HEIGHT: 65 IN | WEIGHT: 140 LBS

## 2021-05-15 VITALS
HEART RATE: 88 BPM | SYSTOLIC BLOOD PRESSURE: 110 MMHG | BODY MASS INDEX: 21.71 KG/M2 | WEIGHT: 130.31 LBS | RESPIRATION RATE: 12 BRPM | HEIGHT: 65 IN | OXYGEN SATURATION: 97 % | DIASTOLIC BLOOD PRESSURE: 68 MMHG

## 2021-05-15 VITALS
OXYGEN SATURATION: 98 % | BODY MASS INDEX: 21.49 KG/M2 | SYSTOLIC BLOOD PRESSURE: 120 MMHG | DIASTOLIC BLOOD PRESSURE: 62 MMHG | WEIGHT: 129 LBS | HEIGHT: 65 IN | TEMPERATURE: 99.9 F | HEART RATE: 98 BPM

## 2021-05-15 VITALS
HEIGHT: 65 IN | DIASTOLIC BLOOD PRESSURE: 65 MMHG | OXYGEN SATURATION: 98 % | BODY MASS INDEX: 21.51 KG/M2 | SYSTOLIC BLOOD PRESSURE: 112 MMHG | RESPIRATION RATE: 25 BRPM | HEART RATE: 90 BPM | WEIGHT: 129.12 LBS | TEMPERATURE: 97.6 F

## 2021-05-16 VITALS
HEIGHT: 65 IN | BODY MASS INDEX: 22.99 KG/M2 | TEMPERATURE: 98 F | WEIGHT: 138 LBS | DIASTOLIC BLOOD PRESSURE: 51 MMHG | HEART RATE: 84 BPM | SYSTOLIC BLOOD PRESSURE: 94 MMHG | OXYGEN SATURATION: 98 %

## 2021-05-16 VITALS
BODY MASS INDEX: 23.87 KG/M2 | SYSTOLIC BLOOD PRESSURE: 111 MMHG | WEIGHT: 143.25 LBS | HEART RATE: 85 BPM | RESPIRATION RATE: 12 BRPM | HEIGHT: 65 IN | DIASTOLIC BLOOD PRESSURE: 66 MMHG | OXYGEN SATURATION: 99 %

## 2021-05-16 VITALS
SYSTOLIC BLOOD PRESSURE: 91 MMHG | HEIGHT: 65 IN | DIASTOLIC BLOOD PRESSURE: 54 MMHG | HEART RATE: 79 BPM | OXYGEN SATURATION: 99 % | BODY MASS INDEX: 23.35 KG/M2 | WEIGHT: 140.12 LBS | TEMPERATURE: 97.7 F

## 2021-05-16 VITALS
WEIGHT: 141.37 LBS | BODY MASS INDEX: 23.55 KG/M2 | HEIGHT: 65 IN | DIASTOLIC BLOOD PRESSURE: 74 MMHG | SYSTOLIC BLOOD PRESSURE: 124 MMHG

## 2021-05-16 VITALS
SYSTOLIC BLOOD PRESSURE: 101 MMHG | WEIGHT: 139.5 LBS | OXYGEN SATURATION: 98 % | HEIGHT: 65 IN | BODY MASS INDEX: 23.24 KG/M2 | HEART RATE: 86 BPM | TEMPERATURE: 98.5 F | DIASTOLIC BLOOD PRESSURE: 57 MMHG

## 2021-05-16 VITALS
HEART RATE: 80 BPM | OXYGEN SATURATION: 98 % | HEIGHT: 65 IN | TEMPERATURE: 98.1 F | BODY MASS INDEX: 22.86 KG/M2 | SYSTOLIC BLOOD PRESSURE: 94 MMHG | WEIGHT: 137.19 LBS | DIASTOLIC BLOOD PRESSURE: 54 MMHG

## 2021-05-28 VITALS
RESPIRATION RATE: 18 BRPM | BODY MASS INDEX: 23.32 KG/M2 | TEMPERATURE: 98.4 F | SYSTOLIC BLOOD PRESSURE: 110 MMHG | WEIGHT: 139.99 LBS | HEIGHT: 65 IN | DIASTOLIC BLOOD PRESSURE: 65 MMHG | HEART RATE: 82 BPM | OXYGEN SATURATION: 100 %

## 2021-05-28 NOTE — PROGRESS NOTES
Patient: BRI BORGES     Acct: [id number]     Report: #[id number]  UNIT #: [id number]     : 1965    Encounter Date:2019  PRIMARY CARE: ALOK PIERCE  ***Signed***  --------------------------------------------------------------------------------------------------------------------  NURSE INTAKE      Visit Type      New Patient Visit            Chief Complaint      BREAST CANCER            Referring Provider/Copies To      Primary Care Provider:  ALOK PIERCE      Copies To:   ALOK PIERCE ;            History and Present Illness      Past Oncology Illness History      1) DCIS: RIGHT breast, LOQ, diagnosed 19, intermediate grade and cribiform     pattern; ER: 100%, ME: 12% clinically staged Tis N0 M0 stage 0            Treatment History:            1) Patient to see surgery and decide on surgical options; f/u to then discuss     adjuvant therapy (19)            HPI - Oncology Interim      Ms. Borges is a 54-year-old female with limited past medical history who is     seen regarding a new diagnosis of ductal carcinoma in situ.  She is seen for     recommendations regarding management from a medical oncology perspective.            Ms. Borges was in her usual state of health when she had a screening     mammogram performed on May 7, 2019.  This identified an area of     microcalcifications located in the lower outer right breast.  It was recommended    to proceed with further evaluation and biopsy.  She underwent diagnostic     mammogram and biopsy on May 31, 2019.  This confirmed a diagnosis of ductal     carcinoma in situ with cribriform pattern and focal necrosis.  It was noted to     be intermediate grade.  It was noted to be estrogen receptor positive (100%),     and progesterone receptor positive (12%).  As a consequence of these findings,     she is now referred to medical oncology for discussion regarding medical     management.            Ms. Borges denies breast  symptoms at this time.  She denies nipple discharge     or breast pain.  She does report a family history for a mother with a history of    primary brain malignancy, but otherwise denies a family history of breast of     ovarian cancer.  She denies a personal history of supplemental estrogen use.  S    he denies tobacco abuse.            Clinical Trial Participant      No            ECOG Performance Status      0            PAST, FAMILY   Past Medical History      Other PMH      IBS      Hematology/Oncology (F):  Breast Cancer            Past Surgical History      Biopsy      CYST REMOVED FROM BREAST            Family History      Family History:  Prostate Cancer (FATHER)      MOM  FROM BRAIN CANCER,PATERNAL GRANDFATHER HAD UNKNOWN CANCER            Social History      Lives independently:  Yes      Number of Children:  1      Occupation:  EMPLOYED            Tobacco Use      Tobacco status:  Never smoker            Substance Use      Substance use:  Denies use            REVIEW OF SYSTEMS      General:  Denies: Appetite Change, Fatigue, Fever, Night Sweats, Weight Gain,     Weight Loss      Eye:  Denies: Blurred Vision, Corrective Lenses, Diplopia, Eye Irritation, Eye     Pain, Eye Redness, Spots in Vision, Vision Loss      ENT:  Denies: Headache, Hearing Loss, Hoarseness, Seizures, Sinus Congestion,     Sore Throat      Cardiovascular:  Denies: Chest Pain, Edema Ankles, Edema Legs, Irregular     Heartbeat, Palpitations      Respiratory:  Denies: Coughing Blood, Productive Cough, Shortness of Air,     Wheezing      Gastrointestinal:  Denies: Bloody Stools, Constipation, Diarrhea, Frequent     Heartburn, Nausea, Problem Swallowing, Tarry Stools, Unable to Control Bowels, V    omiting      Genitourinary (female):  Denies: Blood in Urine, Decrease Urine Stream, Frequent    Urination, Incontinence, Painful Urination      Musculoskeletal:  Denies: Back Pain, Leg Cramps, Muscle Pain, Muscle Weakness,     Painful  Joints, Swollen Joints      Integumentary:  Denies: Bleeds Easily, Bruises Easily, Hair Changes, Jaundice,     Lesions, Mole Changes, Nail Changes, Pigment Changes, Rash, Skin Discoloration      Neurologic:  Denies: Dizziness, Fainting, Numbness\Tingling, Paralysis, Seizures      Psychiatric:  Denies: Anxiety, Confused, Depression, Disoriented, Memory Loss      Endocrine:  Denies: Cold Intolerance, Diabetes, Excessive Sweating, Excessive     Thirst, Excessive Urination, Heat Intolerance, Flushing, Hyperthyroidism,     Hypothyroidism      Hematologic/Lymphatic:  Denies: Bruising, Bleeding, Enlarged Lymph Nodes,     Recurrent Infections, Transfusions      Reproductive:  Denies: Menopause, Heavy Periods, Pregnant, Still Menstruating            VITAL SIGNS AND SCORES      Vitals      Height 5 ft 5.00 in / 165.1 cm      Weight 139 lbs 15.874 oz / 63.5 kg      BSA 1.70 m2      BMI 23.3 kg/m2      Temperature 98.4 F / 36.89 C - Temporal      Pulse 82      Respirations 18      Blood Pressure 110/65 Sitting, Left Arm      Pulse Oximetry 100%, RM AIR            Pain Score      Experiencing any pain?:  No      Pain Scale Used:  Numerical      Pain Intensity:  0            Fatigue Score      Experiencing any fatigue?:  No      Fatigue (0-10 scale):  0 (none)            EXAM      General Appearance:  Positive for: Alert, Oriented x3      Eye:  Positive for: Anicteric Sclerae, PERRLA      HEENT:  Negative for: Scleral Icterus      Skin:  Positive for: Induration, Lesions      Psychiatric:  Positive for: AAO X 3            PREVENTION      2 or More Falls Past Year?:  No      Fall Past Year with Injury?:  No      Encouraged to follow-up with:  PCP regarding preventative exams.      Chart initiated by      MONIKA REID CMA            ALLERGY/MEDS      Allergies      Coded Allergies:             *No Known Allergies (Verified  Allergy, Mild, 6/6/19)            Medications            Mirtazapine (Remeron) 15 Mg Tablet      15 MG PO  "QDAY, #30 TAB 0 Refills         Reported         6/6/19       Aspirin Chew (Aspirin Baby) 81 Mg Tab.chew      81 MG PO QDAY, #30 TAB.CHEW 0 Refills         Reported         6/6/19      Medications Reviewed:  Changes made to meds            IMPRESSION/PLAN      Impression      1) DCIS: Ms. Inman is a 54-year-old female with a diagnosis of ductal     carcinoma in situ.  I had an extensive discussion today with her regarding this     diagnosis and management.  I explained to her that ductal carcinoma in situ is     considered a \"premalignancy\" and that it has the potential to progress into an     invasive malignancy, but in and of itself it is a localized disease.  As a     consequence, local therapy options are recommended.  Specifically, I reviewed     with her the NCCN guidelines which would recommend as primary treatment options     either lumpectomy without lymph node surgery plus whole breast radiation therapy    as a category 1 recommendation.  Alternatively, total mastectomy with or without    sentinel lymph node biopsy or lumpectomy without lymph node surgery with     accelerated partial breast irradiation or lumpectomy without lymph node surgery     or radiation or alternative options, with the caveat that lumpectomy without     radiation therapy is a category 2B recommendation.  I encouraged her to further     discuss this with her surgeon and radiation oncologist.  I did also explained to    her that adjuvant medical therapy would be contingent upon her decision     regarding primary management.  Specifically, adjuvant endocrine therapy is     recommended for consideration, but is a category 1 recommendation for patients     who received breast conserving therapy and radiation therapy and are noted to be    estrogen receptor positive as she is.  It is also recommended for consideration     for patients were treated with excision alone.  We then discussed the various     options.  I discussed with her " "both tamoxifen and aromatase inhibitor therapy.      Again, the NCCN guidelines would recommend that there is advantage for aromatase    inhibitors in patients less than 60 years of age.  I discussed potential side     effects including but not limited to osteoporosis, arthralgias, asthenia, fluid     retention, thrombotic events, uterine cancer, and cataracts.  She did express     concern regarding the \"over treatment of this disease\".  I did review with her     that the general benefit of these treatments is limited, but is statistically     significant and therefore recommended a standard of care.  In part, this is due     to the fact that her disease is currently incurable and recurrent invasive     malignancy would be potentially incurable.  Therefore, treatment decisions are     based upon risk and benefit.  In addition, the national guidelines also give     data driven recommendations,   In the recommended course of action is based upon    these recommendations.  She indicates understanding.  She will first proceed     with follow-up with her surgeon.  She will return in roughly 3 to 4 weeks     following her surgery to once again review adjuvant therapy options.  All of her    questions were answered to her satisfaction.            Diagnosis      Ductal carcinoma in situ (DCIS) of right breast - D05.11            Notes      New Medications      * Aspirin Chew (Aspirin Baby) 81 MG TAB.CHEW: 81 MG PO QDAY #30      * Mirtazapine (Remeron) 15 MG TABLET: 15 MG PO QDAY #30      Discontinued Medications      * CYCLOBENZAPRINE HCL (Cyclobenzaprine*) 5 MG TABLET: 5 MG PO TID PRN MUSCLE       SPASMS 5 Days #15      * NAPROXEN (Naprosyn*) 500 MG TABLET: 500 MG PO BID 7 Days #14            Plan      1) f/u with Surgeon regarding local therapy            2) RTC 3-4 weeks following surgery to review path results and discuss adjuvant     therapy             The total time the visit was 60 minutes.  Over 50% of time was " spent in face-to-    face counseling reviewing the diagnosis, local therapy options, NCCN guideline     recommendations regarding management, and coordination of care.            Patient Education      Patient Education Provided:  Yes                 Disclaimer: Converted document may not contain table formatting or lab diagrams. Please see [facility] for the authenticated document.

## 2021-07-02 ENCOUNTER — TELEPHONE (OUTPATIENT)
Dept: FAMILY MEDICINE CLINIC | Facility: CLINIC | Age: 56
End: 2021-07-02

## 2021-07-02 NOTE — TELEPHONE ENCOUNTER
Caller: Jemma Inman    Relationship: Self    Best call back number:  [phone]    What is the best time to reach you: ANYTIME    Who are you requesting to speak with (clinical staff, provider,  specific staff member):     Do you know the name of the person who called:     What was the call regarding: PATIENT NEEDS TO HAVE URINALYSIS FOR A POSSIBLE KIDNEY INFECTION    Do you require a callback:

## 2021-07-06 NOTE — TELEPHONE ENCOUNTER
Left detailed message for patient to either come into the office to give a urine sample to test and send out or I can send in an order to [facility]. Let patient know to call me back.

## 2022-03-16 ENCOUNTER — TRANSCRIBE ORDERS (OUTPATIENT)
Dept: ADMINISTRATIVE | Facility: HOSPITAL | Age: 57
End: 2022-03-16

## 2022-03-16 DIAGNOSIS — Z12.31 SCREENING MAMMOGRAM, ENCOUNTER FOR: Primary | ICD-10-CM

## 2022-04-12 ENCOUNTER — APPOINTMENT (OUTPATIENT)
Dept: MAMMOGRAPHY | Facility: HOSPITAL | Age: 57
End: 2022-04-12

## 2022-04-25 ENCOUNTER — HOSPITAL ENCOUNTER (OUTPATIENT)
Dept: MAMMOGRAPHY | Facility: HOSPITAL | Age: 57
Discharge: HOME | End: 2022-04-25
Admitting: INTERNAL MEDICINE

## 2022-04-25 DIAGNOSIS — Z12.31 SCREENING MAMMOGRAM, ENCOUNTER FOR: ICD-10-CM

## 2022-04-25 PROCEDURE — 77063 BREAST TOMOSYNTHESIS BI: CPT

## 2022-04-25 PROCEDURE — 77067 SCR MAMMO BI INCL CAD: CPT

## 2022-05-02 ENCOUNTER — TRANSCRIBE ORDERS (OUTPATIENT)
Dept: CASE MANAGEMENT | Facility: OTHER | Age: 57
End: 2022-05-02

## 2022-05-02 DIAGNOSIS — R92.8 ABNORMAL MAMMOGRAM: Primary | ICD-10-CM

## 2022-05-06 ENCOUNTER — HOSPITAL ENCOUNTER (OUTPATIENT)
Dept: MAMMOGRAPHY | Facility: HOSPITAL | Age: 57
Discharge: HOME | End: 2022-05-06

## 2022-05-06 ENCOUNTER — HOSPITAL ENCOUNTER (OUTPATIENT)
Dept: ULTRASOUND IMAGING | Facility: HOSPITAL | Age: 57
Discharge: HOME | End: 2022-05-06

## 2022-05-06 DIAGNOSIS — R92.8 ABNORMAL MAMMOGRAM: ICD-10-CM

## 2022-05-06 PROCEDURE — 76642 ULTRASOUND BREAST LIMITED: CPT

## 2022-05-06 PROCEDURE — G0279 TOMOSYNTHESIS, MAMMO: HCPCS

## 2022-05-06 PROCEDURE — 77065 DX MAMMO INCL CAD UNI: CPT

## 2022-08-30 ENCOUNTER — PREP FOR SURGERY (OUTPATIENT)
Dept: OTHER | Facility: HOSPITAL | Age: 57
End: 2022-08-30

## 2022-08-30 ENCOUNTER — OFFICE VISIT (OUTPATIENT)
Dept: GASTROENTEROLOGY | Facility: CLINIC | Age: 57
End: 2022-08-30

## 2022-08-30 VITALS
SYSTOLIC BLOOD PRESSURE: 119 MMHG | OXYGEN SATURATION: 99 % | DIASTOLIC BLOOD PRESSURE: 56 MMHG | BODY MASS INDEX: 20.16 KG/M2 | HEART RATE: 78 BPM | HEIGHT: 65 IN | WEIGHT: 121 LBS

## 2022-08-30 DIAGNOSIS — K58.2 IRRITABLE BOWEL SYNDROME WITH BOTH CONSTIPATION AND DIARRHEA: ICD-10-CM

## 2022-08-30 DIAGNOSIS — R10.84 GENERALIZED ABDOMINAL PAIN: Primary | ICD-10-CM

## 2022-08-30 PROCEDURE — 99214 OFFICE O/P EST MOD 30 MIN: CPT | Performed by: INTERNAL MEDICINE

## 2022-08-30 RX ORDER — RIFAXIMIN 550 MG/1
TABLET ORAL TAKE AS DIRECTED
COMMUNITY
Start: 2022-07-22 | End: 2022-11-16

## 2022-08-30 RX ORDER — ERGOCALCIFEROL 1.25 MG/1
50000 CAPSULE ORAL
COMMUNITY
Start: 2022-06-09

## 2022-08-30 RX ORDER — TAMOXIFEN CITRATE 10 MG/1
10 TABLET ORAL DAILY
COMMUNITY
Start: 2022-08-09

## 2022-08-30 RX ORDER — HYDROCORTISONE ACETATE 25 MG/1
25 SUPPOSITORY RECTAL AS NEEDED
COMMUNITY
Start: 2022-08-24

## 2022-08-30 NOTE — PROGRESS NOTES
"Chief Complaint    Jemma Inman is a 57 y.o. female who presents to [facility] for follow-up after a long absence.  The patient has a long history of irritable bowel syndrome and has underlying concerns of small intestinal bacterial overgrowth.  She has used Xifaxan intermittently in the past with varying amounts of success.  She has used antispasmodics without much improvement.  She has had fecal incontinence as well and underwent pelvic floor rehabilitation and evaluation.  She still describes 1-2 episodes of fecal incontinence per week.  She has seen 7 GI physicians in total she reports.  Her last EGD and colonoscopy were from 2014.  She has a history of H. pylori as well.    Result Review :     The following data was reviewed by: Frantz Krishna MD on 08/30/2022:              Data reviewed: GI studies Reviewed     Past Medical History:   Diagnosis Date   • Anxiety    • Breast cancer (HCC)     RIGHT    • History of irregular heartbeat    • IBS (irritable bowel syndrome)    • Lower back pain    • PONV (postoperative nausea and vomiting)    • Tinnitus        Past Surgical History:   Procedure Laterality Date   • BREAST BIOPSY      1992 Left-benign 2019 Right-malignant   • BREAST LUMPECTOMY Right 07/24/2019    Procedure: RIGHT BREAST LUMPECTOMY WITH NEEDLE LOCALIZATION;  Surgeon: Ervin Myers MD;  Location: [facility];  Service: General   • COLONOSCOPY  2014   • UPPER GASTROINTESTINAL ENDOSCOPY  2014       Social History     Social History Narrative   • Not on file       Objective     Vital Signs:   /56   Pulse 78   Ht 165.1 cm (65\")   Wt 54.9 kg (121 lb)   SpO2 99%   BMI 20.14 kg/m²     Body mass index is 20.14 kg/m².    Physical Exam            Assessment and Plan    Diagnoses and all orders for this visit:    1. Generalized abdominal pain (Primary)    2. Irritable bowel syndrome with both constipation and diarrhea      Patient has long history " of irritable bowel syndrome which seems to persist at this time in combination with fecal incontinence.  Given her continued abdominal pain and change in bowel habits I will schedule her for both EGD and colonoscopy.  She also plans to seek an opinion at the [facility] which I think is appropriate given her failure to significantly improve despite multiple medical trials.  She currently is lactose intolerant and does avoid dairy.  She also has a history of H. pylori.  She tends to alternate between diarrhea and constipation.            Follow Up   No follow-ups on file.  Patient was given instructions and counseling regarding her condition or for health maintenance advice. Please see specific information pulled into the AVS if appropriate.

## 2022-10-18 ENCOUNTER — TRANSCRIBE ORDERS (OUTPATIENT)
Dept: ADMINISTRATIVE | Facility: HOSPITAL | Age: 57
End: 2022-10-18

## 2022-10-18 DIAGNOSIS — C50.919 MALIGNANT NEOPLASM OF FEMALE BREAST, UNSPECIFIED ESTROGEN RECEPTOR STATUS, UNSPECIFIED LATERALITY, UNSPECIFIED SITE OF BREAST: Primary | ICD-10-CM

## 2022-10-31 ENCOUNTER — HOSPITAL ENCOUNTER (EMERGENCY)
Facility: HOSPITAL | Age: 57
Discharge: HOME | End: 2022-10-31
Attending: EMERGENCY MEDICINE | Admitting: EMERGENCY MEDICINE

## 2022-10-31 ENCOUNTER — APPOINTMENT (OUTPATIENT)
Dept: CT IMAGING | Facility: HOSPITAL | Age: 57
End: 2022-10-31

## 2022-10-31 VITALS
DIASTOLIC BLOOD PRESSURE: 67 MMHG | HEIGHT: 65 IN | SYSTOLIC BLOOD PRESSURE: 129 MMHG | TEMPERATURE: 98 F | OXYGEN SATURATION: 100 % | HEART RATE: 81 BPM | RESPIRATION RATE: 18 BRPM | BODY MASS INDEX: 20.28 KG/M2 | WEIGHT: 121.69 LBS

## 2022-10-31 DIAGNOSIS — M54.50 ACUTE LEFT-SIDED LOW BACK PAIN, UNSPECIFIED WHETHER SCIATICA PRESENT: Primary | ICD-10-CM

## 2022-10-31 DIAGNOSIS — M62.838 MUSCLE SPASM: ICD-10-CM

## 2022-10-31 LAB
ALBUMIN SERPL-MCNC: 4.2 G/DL (ref 3.5–5.2)
ALBUMIN/GLOB SERPL: 1.6 G/DL
ALP SERPL-CCNC: 79 U/L (ref 39–117)
ALT SERPL W P-5'-P-CCNC: 13 U/L (ref 1–33)
ANION GAP SERPL CALCULATED.3IONS-SCNC: 7.8 MMOL/L (ref 5–15)
AST SERPL-CCNC: 20 U/L (ref 1–32)
BACTERIA UR QL AUTO: ABNORMAL /HPF
BASOPHILS # BLD AUTO: 0.07 10*3/MM3 (ref 0–0.2)
BASOPHILS NFR BLD AUTO: 1.3 % (ref 0–1.5)
BILIRUB SERPL-MCNC: 0.4 MG/DL (ref 0–1.2)
BILIRUB UR QL STRIP: NEGATIVE
BUN SERPL-MCNC: 9 MG/DL (ref 6–20)
BUN/CREAT SERPL: 13.4 (ref 7–25)
CALCIUM SPEC-SCNC: 8.8 MG/DL (ref 8.6–10.5)
CHLORIDE SERPL-SCNC: 100 MMOL/L (ref 98–107)
CLARITY UR: CLEAR
CO2 SERPL-SCNC: 29.2 MMOL/L (ref 22–29)
COLOR UR: YELLOW
CREAT BLDA-MCNC: 0.7 MG/DL
CREAT SERPL-MCNC: 0.67 MG/DL (ref 0.57–1)
DEPRECATED RDW RBC AUTO: 43.9 FL (ref 37–54)
EGFRCR SERPLBLD CKD-EPI 2021: 100.8 ML/MIN/1.73
EGFRCR SERPLBLD CKD-EPI 2021: 99.8 ML/MIN/1.73
EOSINOPHIL # BLD AUTO: 0.22 10*3/MM3 (ref 0–0.4)
EOSINOPHIL NFR BLD AUTO: 4 % (ref 0.3–6.2)
ERYTHROCYTE [DISTWIDTH] IN BLOOD BY AUTOMATED COUNT: 12.8 % (ref 12.3–15.4)
GLOBULIN UR ELPH-MCNC: 2.6 GM/DL
GLUCOSE SERPL-MCNC: 94 MG/DL (ref 65–99)
GLUCOSE UR STRIP-MCNC: NEGATIVE MG/DL
HCT VFR BLD AUTO: 36.7 % (ref 34–46.6)
HGB BLD-MCNC: 12.5 G/DL (ref 12–15.9)
HGB UR QL STRIP.AUTO: ABNORMAL
HOLD SPECIMEN: NORMAL
HYALINE CASTS UR QL AUTO: ABNORMAL /LPF
IMM GRANULOCYTES # BLD AUTO: 0.01 10*3/MM3 (ref 0–0.05)
IMM GRANULOCYTES NFR BLD AUTO: 0.2 % (ref 0–0.5)
KETONES UR QL STRIP: NEGATIVE
LEUKOCYTE ESTERASE UR QL STRIP.AUTO: NEGATIVE
LYMPHOCYTES # BLD AUTO: 1.89 10*3/MM3 (ref 0.7–3.1)
LYMPHOCYTES NFR BLD AUTO: 34.1 % (ref 19.6–45.3)
MCH RBC QN AUTO: 31.9 PG (ref 26.6–33)
MCHC RBC AUTO-ENTMCNC: 34.1 G/DL (ref 31.5–35.7)
MCV RBC AUTO: 93.6 FL (ref 79–97)
MONOCYTES # BLD AUTO: 0.47 10*3/MM3 (ref 0.1–0.9)
MONOCYTES NFR BLD AUTO: 8.5 % (ref 5–12)
NEUTROPHILS NFR BLD AUTO: 2.88 10*3/MM3 (ref 1.7–7)
NEUTROPHILS NFR BLD AUTO: 51.9 % (ref 42.7–76)
NITRITE UR QL STRIP: NEGATIVE
NRBC BLD AUTO-RTO: 0 /100 WBC (ref 0–0.2)
PH UR STRIP.AUTO: 8 [PH] (ref 5–8)
PLATELET # BLD AUTO: 234 10*3/MM3 (ref 140–450)
PMV BLD AUTO: 10.4 FL (ref 6–12)
POTASSIUM SERPL-SCNC: 4.3 MMOL/L (ref 3.5–5.2)
PROT SERPL-MCNC: 6.8 G/DL (ref 6–8.5)
PROT UR QL STRIP: NEGATIVE
RBC # BLD AUTO: 3.92 10*6/MM3 (ref 3.77–5.28)
RBC # UR STRIP: ABNORMAL /HPF
REF LAB TEST METHOD: ABNORMAL
SODIUM SERPL-SCNC: 137 MMOL/L (ref 136–145)
SP GR UR STRIP: <=1.005 (ref 1–1.03)
SQUAMOUS #/AREA URNS HPF: ABNORMAL /HPF
UROBILINOGEN UR QL STRIP: ABNORMAL
WBC # UR STRIP: ABNORMAL /HPF
WBC NRBC COR # BLD: 5.54 10*3/MM3 (ref 3.4–10.8)

## 2022-10-31 PROCEDURE — 99283 EMERGENCY DEPT VISIT LOW MDM: CPT

## 2022-10-31 PROCEDURE — 81001 URINALYSIS AUTO W/SCOPE: CPT | Performed by: EMERGENCY MEDICINE

## 2022-10-31 PROCEDURE — 96374 THER/PROPH/DIAG INJ IV PUSH: CPT

## 2022-10-31 PROCEDURE — 25010000002 HYDROMORPHONE 1 MG/ML SOLUTION: Performed by: EMERGENCY MEDICINE

## 2022-10-31 PROCEDURE — 96375 TX/PRO/DX INJ NEW DRUG ADDON: CPT

## 2022-10-31 PROCEDURE — 80053 COMPREHEN METABOLIC PANEL: CPT | Performed by: EMERGENCY MEDICINE

## 2022-10-31 PROCEDURE — 0 IOPAMIDOL PER 1 ML: Performed by: EMERGENCY MEDICINE

## 2022-10-31 PROCEDURE — 81003 URINALYSIS AUTO W/O SCOPE: CPT | Performed by: EMERGENCY MEDICINE

## 2022-10-31 PROCEDURE — 85025 COMPLETE CBC W/AUTO DIFF WBC: CPT | Performed by: EMERGENCY MEDICINE

## 2022-10-31 PROCEDURE — 25010000002 LORAZEPAM PER 2 MG: Performed by: EMERGENCY MEDICINE

## 2022-10-31 PROCEDURE — 74177 CT ABD & PELVIS W/CONTRAST: CPT

## 2022-10-31 PROCEDURE — 25010000002 KETOROLAC TROMETHAMINE PER 15 MG: Performed by: EMERGENCY MEDICINE

## 2022-10-31 PROCEDURE — 82565 ASSAY OF CREATININE: CPT

## 2022-10-31 RX ORDER — KETOROLAC TROMETHAMINE 30 MG/ML
30 INJECTION, SOLUTION INTRAMUSCULAR; INTRAVENOUS ONCE
Status: COMPLETED | OUTPATIENT
Start: 2022-10-31 | End: 2022-10-31

## 2022-10-31 RX ORDER — HYDROCODONE BITARTRATE AND ACETAMINOPHEN 5; 325 MG/1; MG/1
1 TABLET ORAL EVERY 6 HOURS PRN
Qty: 15 TABLET | Refills: 0 | Status: ON HOLD | OUTPATIENT
Start: 2022-10-31 | End: 2022-11-17

## 2022-10-31 RX ORDER — CYCLOBENZAPRINE HCL 10 MG
10 TABLET ORAL 3 TIMES DAILY PRN
Qty: 15 TABLET | Refills: 0 | Status: SHIPPED | OUTPATIENT
Start: 2022-10-31

## 2022-10-31 RX ORDER — LORAZEPAM 2 MG/ML
1 INJECTION INTRAMUSCULAR ONCE
Status: COMPLETED | OUTPATIENT
Start: 2022-10-31 | End: 2022-10-31

## 2022-10-31 RX ADMIN — SODIUM CHLORIDE 500 ML: 9 INJECTION, SOLUTION INTRAVENOUS at 15:17

## 2022-10-31 RX ADMIN — HYDROMORPHONE HYDROCHLORIDE 1 MG: 1 INJECTION, SOLUTION INTRAMUSCULAR; INTRAVENOUS; SUBCUTANEOUS at 17:08

## 2022-10-31 RX ADMIN — IOPAMIDOL 100 ML: 755 INJECTION, SOLUTION INTRAVENOUS at 16:14

## 2022-10-31 RX ADMIN — LORAZEPAM 1 MG: 2 INJECTION INTRAMUSCULAR; INTRAVENOUS at 15:19

## 2022-10-31 RX ADMIN — KETOROLAC TROMETHAMINE 30 MG: 30 INJECTION, SOLUTION INTRAMUSCULAR; INTRAVENOUS at 15:16

## 2022-11-03 ENCOUNTER — TELEPHONE (OUTPATIENT)
Dept: GASTROENTEROLOGY | Facility: CLINIC | Age: 57
End: 2022-11-03

## 2022-11-03 NOTE — TELEPHONE ENCOUNTER
I spoke with Ms Inman, confirmed egd/colonoscopy on 11.17.22, arrival time of 9:30am.. Reminded of liquid diet the day prior. Reminded of bowel prep and instructions. Voiced understanding. adelita

## 2022-11-04 ENCOUNTER — HOSPITAL ENCOUNTER (OUTPATIENT)
Dept: MAMMOGRAPHY | Facility: HOSPITAL | Age: 57
Discharge: HOME | End: 2022-11-04

## 2022-11-04 ENCOUNTER — HOSPITAL ENCOUNTER (OUTPATIENT)
Dept: ULTRASOUND IMAGING | Facility: HOSPITAL | Age: 57
Discharge: HOME | End: 2022-11-04

## 2022-11-04 DIAGNOSIS — C50.919 MALIGNANT NEOPLASM OF FEMALE BREAST, UNSPECIFIED ESTROGEN RECEPTOR STATUS, UNSPECIFIED LATERALITY, UNSPECIFIED SITE OF BREAST: ICD-10-CM

## 2022-11-04 PROCEDURE — G0279 TOMOSYNTHESIS, MAMMO: HCPCS

## 2022-11-04 PROCEDURE — 77066 DX MAMMO INCL CAD BI: CPT

## 2022-11-16 NOTE — PRE-PROCEDURE INSTRUCTIONS
PT INSTRUCTED ON CLEAR LIQ DIET AND PO SPLIT PREP LAST BM SHOULD BE CLEAR no meds noted ARRIVAL TIME IS  0930 am ON 11/17/22

## 2022-11-17 ENCOUNTER — HOSPITAL ENCOUNTER (OUTPATIENT)
Facility: HOSPITAL | Age: 57
Setting detail: HOSPITAL OUTPATIENT SURGERY
Discharge: HOME | End: 2022-11-17
Attending: INTERNAL MEDICINE | Admitting: INTERNAL MEDICINE

## 2022-11-17 ENCOUNTER — ANESTHESIA EVENT (OUTPATIENT)
Dept: GASTROENTEROLOGY | Facility: HOSPITAL | Age: 57
End: 2022-11-17

## 2022-11-17 ENCOUNTER — ANESTHESIA (OUTPATIENT)
Dept: GASTROENTEROLOGY | Facility: HOSPITAL | Age: 57
End: 2022-11-17

## 2022-11-17 VITALS
RESPIRATION RATE: 18 BRPM | SYSTOLIC BLOOD PRESSURE: 103 MMHG | HEART RATE: 76 BPM | TEMPERATURE: 98.1 F | OXYGEN SATURATION: 100 % | DIASTOLIC BLOOD PRESSURE: 64 MMHG

## 2022-11-17 DIAGNOSIS — K58.2 IRRITABLE BOWEL SYNDROME WITH BOTH CONSTIPATION AND DIARRHEA: ICD-10-CM

## 2022-11-17 DIAGNOSIS — R10.84 GENERALIZED ABDOMINAL PAIN: ICD-10-CM

## 2022-11-17 PROCEDURE — 43239 EGD BIOPSY SINGLE/MULTIPLE: CPT | Performed by: INTERNAL MEDICINE

## 2022-11-17 PROCEDURE — 45380 COLONOSCOPY AND BIOPSY: CPT | Performed by: INTERNAL MEDICINE

## 2022-11-17 PROCEDURE — 88305 TISSUE EXAM BY PATHOLOGIST: CPT | Performed by: INTERNAL MEDICINE

## 2022-11-17 PROCEDURE — 25010000002 PROPOFOL 10 MG/ML EMULSION: Performed by: NURSE ANESTHETIST, CERTIFIED REGISTERED

## 2022-11-17 RX ORDER — PROPOFOL 10 MG/ML
VIAL (ML) INTRAVENOUS AS NEEDED
Status: DISCONTINUED | OUTPATIENT
Start: 2022-11-17 | End: 2022-11-17 | Stop reason: SURG

## 2022-11-17 RX ORDER — SODIUM CHLORIDE, SODIUM LACTATE, POTASSIUM CHLORIDE, CALCIUM CHLORIDE 600; 310; 30; 20 MG/100ML; MG/100ML; MG/100ML; MG/100ML
30 INJECTION, SOLUTION INTRAVENOUS CONTINUOUS
Status: DISCONTINUED | OUTPATIENT
Start: 2022-11-17 | End: 2022-11-17 | Stop reason: HOSPADM

## 2022-11-17 RX ORDER — LIDOCAINE HYDROCHLORIDE 20 MG/ML
INJECTION, SOLUTION EPIDURAL; INFILTRATION; INTRACAUDAL; PERINEURAL AS NEEDED
Status: DISCONTINUED | OUTPATIENT
Start: 2022-11-17 | End: 2022-11-17 | Stop reason: SURG

## 2022-11-17 RX ADMIN — PROPOFOL 50 MG: 10 INJECTION, EMULSION INTRAVENOUS at 13:45

## 2022-11-17 RX ADMIN — SODIUM CHLORIDE, POTASSIUM CHLORIDE, SODIUM LACTATE AND CALCIUM CHLORIDE: 600; 310; 30; 20 INJECTION, SOLUTION INTRAVENOUS at 13:43

## 2022-11-17 RX ADMIN — LIDOCAINE HYDROCHLORIDE 100 MG: 20 INJECTION, SOLUTION EPIDURAL; INFILTRATION; INTRACAUDAL; PERINEURAL at 13:45

## 2022-11-17 RX ADMIN — PROPOFOL 200 MCG/KG/MIN: 10 INJECTION, EMULSION INTRAVENOUS at 13:45

## 2022-11-17 NOTE — ANESTHESIA PREPROCEDURE EVALUATION
Anesthesia Evaluation     Patient summary reviewed and Nursing notes reviewed   history of anesthetic complications: PONV  NPO Solid Status: > 8 hours  NPO Liquid Status: > 2 hours           Airway   Mallampati: I  TM distance: >3 FB  Neck ROM: full  No difficulty expected  Dental      Pulmonary - negative pulmonary ROS and normal exam    breath sounds clear to auscultation  Cardiovascular - negative cardio ROS and normal exam  Exercise tolerance: good (4-7 METS)    Rhythm: regular  Rate: normal        Neuro/Psych  (+) psychiatric history,    GI/Hepatic/Renal/Endo - negative ROS     Musculoskeletal (-) negative ROS    Abdominal    Substance History - negative use     OB/GYN negative ob/gyn ROS         Other      history of cancer    ROS/Med Hx Other: PAT Nursing Notes unavailable.                   Anesthesia Plan    ASA 2     general     (Total IV Anesthesia    Patient understands anesthesia not responsible for dental damage.  )  intravenous induction     Anesthetic plan, risks, benefits, and alternatives have been provided, discussed and informed consent has been obtained with: patient.    Plan discussed with CRNA.        CODE STATUS:

## 2022-11-17 NOTE — H&P
Pre Procedure History & Physical    Chief Complaint:   generalzied abdominal pain  IBS    Subjective     HPI:   Abdominal pain  ibs    Past Medical History:   Past Medical History:   Diagnosis Date   • Anxiety    • Breast cancer (HCC)     RIGHT    • History of irregular heartbeat    • IBS (irritable bowel syndrome)    • Lower back pain    • PONV (postoperative nausea and vomiting)    • Tinnitus        Past Surgical History:  Past Surgical History:   Procedure Laterality Date   • BREAST BIOPSY      1992 Left-benign 2019 Right-malignant   • BREAST LUMPECTOMY Right 07/24/2019    Procedure: RIGHT BREAST LUMPECTOMY WITH NEEDLE LOCALIZATION;  Surgeon: Ervin Myers MD;  Location: [facility];  Service: General   • COLONOSCOPY      2002, 2014   • UPPER GASTROINTESTINAL ENDOSCOPY  2014       Family History:  Family History   Problem Relation Age of Onset   • Depression Mother    • Brain cancer Mother    • Prostate cancer Father    • Breast cancer Paternal Aunt    • Breast cancer Paternal Aunt    • Colon cancer Paternal Aunt    • Prostate cancer Paternal Uncle    • Prostate cancer Paternal Uncle    • Throat cancer Paternal Uncle    • Cancer Paternal Grandfather         eye   • Malig Hyperthermia Neg Hx        Social History:   reports that she quit smoking about 14 years ago. Her smoking use included cigarettes. She has a 15.00 pack-year smoking history. She has never used smokeless tobacco. She reports that she does not drink alcohol and does not use drugs.    Medications:   Medications Prior to Admission   Medication Sig Dispense Refill Last Dose   • cyclobenzaprine (FLEXERIL) 10 MG tablet Take 1 tablet by mouth 3 (Three) Times a Day As Needed for Muscle Spasms. 15 tablet 0 11/16/2022   • mirtazapine (REMERON) 15 MG tablet Take 30 mg by mouth Every Night.  1 11/16/2022   • tamoxifen (NOLVADEX) 10 MG tablet Take 10 mg by mouth Daily.   11/16/2022   • vitamin D (ERGOCALCIFEROL) 1.25 MG (55778 UT) capsule capsule  Take 50,000 Units by mouth Every 7 (Seven) Days. friday 11/16/2022   • aspirin 81 MG chewable tablet Chew 81 mg Daily. Last dose Sunday 11/13 11/12/2022   • hydrocortisone (ANUSOL-HC) 25 MG suppository 25 mg As Needed.   Unknown       Allergies:  Patient has no known allergies.        Objective     Blood pressure 125/77, pulse 72, temperature 97.9 °F (36.6 °C), temperature source Temporal, resp. rate 18, SpO2 100 %.    Physical Exam   Constitutional: Pt is oriented to person, place, and time and well-developed, well-nourished, and in no distress.   Mouth/Throat: Oropharynx is clear and moist.   Neck: Normal range of motion.   Cardiovascular: Normal rate, regular rhythm and normal heart sounds.    Pulmonary/Chest: Effort normal and breath sounds normal.   Abdominal: Soft. Nontender  Skin: Skin is warm and dry.   Psychiatric: Mood, memory, affect and judgment normal.     Assessment & Plan     Diagnosis:  Abdominal pain, generalized  ibs    Anticipated Surgical Procedure:  egd  colonoscopy    The risks, benefits, and alternatives of this procedure have been discussed with the patient or the responsible party- the patient understands and agrees to proceed.

## 2022-11-17 NOTE — ANESTHESIA POSTPROCEDURE EVALUATION
Patient: Jemma Inman    Procedure Summary     Date: 11/17/22 Room / Location: [facility] ENDOSCOPY 3 / [facility] ENDOSCOPY    Anesthesia Start: 1343 Anesthesia Stop: 1409    Procedures:       ESOPHAGOGASTRODUODENOSCOPY WITH BIOPSIES      COLONOSCOPY WITH BIOPSIES Diagnosis:       Generalized abdominal pain      Irritable bowel syndrome with both constipation and diarrhea      (Generalized abdominal pain [R10.84])      (Irritable bowel syndrome with both constipation and diarrhea [K58.2])    Surgeons: Frantz Krishna MD Provider: Sundar Fragoso MD    Anesthesia Type: general ASA Status: 2          Anesthesia Type: general    Vitals  Vitals Value Taken Time   BP 93/55 11/17/22 1418   Temp 36.7 °C (98 °F) 11/17/22 1408   Pulse 71 11/17/22 1421   Resp 16 11/17/22 1418   SpO2 100 % 11/17/22 1421   Vitals shown include unvalidated device data.        Post Anesthesia Care and Evaluation    Patient location during evaluation: bedside  Patient participation: complete - patient participated  Level of consciousness: awake  Pain management: adequate    Airway patency: patent  Anesthetic complications: No anesthetic complications  PONV Status: none  Cardiovascular status: acceptable and stable  Respiratory status: acceptable  Hydration status: acceptable    Comments: An Anesthesiologist personally participated in the most demanding procedures (including induction and emergence if applicable) in the anesthesia plan, monitored the course of anesthesia administration at frequent intervals and remained physically present and available for immediate diagnosis and treatment of emergencies.

## 2022-11-21 LAB
CYTO UR: NORMAL
LAB AP CASE REPORT: NORMAL
LAB AP CLINICAL INFORMATION: NORMAL
PATH REPORT.FINAL DX SPEC: NORMAL
PATH REPORT.GROSS SPEC: NORMAL

## 2022-11-23 ENCOUNTER — TELEPHONE (OUTPATIENT)
Dept: GASTROENTEROLOGY | Facility: CLINIC | Age: 57
End: 2022-11-23

## 2022-11-23 NOTE — TELEPHONE ENCOUNTER
S/w patient, aware of results. Patient placed in 5 year colon recall ----- Message from JOSE Streeter sent at 11/22/2022  8:47 AM EST -----  I have reviewed the patients upper endoscopy and pathology. Esophageal, stomach, and duodenum biopsies are normal. Biopsies are negative for H. Pylori, dysplasia, metaplasia, and malignancy.    I have reviewed the patients colonoscopy report. The report showed a normal colonoscopy with diverticulosis and internal hemorrhoids.  Random colon biopsies are normal, negative for microscopic colitis. Repeat colonoscopy in 5 years. Please place in recall.

## 2023-05-11 ENCOUNTER — TELEPHONE (OUTPATIENT)
Dept: GASTROENTEROLOGY | Facility: CLINIC | Age: 58
End: 2023-05-11
Payer: OTHER GOVERNMENT

## 2023-05-11 DIAGNOSIS — K64.9 HEMORRHOIDS, UNSPECIFIED HEMORRHOID TYPE: Primary | ICD-10-CM

## 2023-05-11 RX ORDER — PRAMOXINE HYDROCHLORIDE 10 MG/G
AEROSOL, FOAM TOPICAL EVERY 4 HOURS PRN
Qty: 1 EACH | Refills: 3 | Status: SHIPPED | OUTPATIENT
Start: 2023-05-11 | End: 2023-06-10

## 2023-05-11 NOTE — TELEPHONE ENCOUNTER
Patient called the office inquiring if she needed her follow up appt next week. I reviewed endoscopy results with patient. Pt states the suppositories are not providing any relief. Per Samanta Branham, pt to use proctofoam. Pt uses [email or web address] as her pharmacy. Pt is requesting to cancel appointment next week with Nabila De Paz. I have cancelled appointment.

## 2023-05-16 PROCEDURE — 87086 URINE CULTURE/COLONY COUNT: CPT | Performed by: FAMILY MEDICINE

## 2023-05-18 ENCOUNTER — TELEPHONE (OUTPATIENT)
Dept: URGENT CARE | Facility: CLINIC | Age: 58
End: 2023-05-18
Payer: OTHER GOVERNMENT

## 2023-08-14 PROCEDURE — 87186 SC STD MICRODIL/AGAR DIL: CPT | Performed by: NURSE PRACTITIONER

## 2023-08-14 PROCEDURE — 87086 URINE CULTURE/COLONY COUNT: CPT | Performed by: NURSE PRACTITIONER

## 2023-08-14 PROCEDURE — 87077 CULTURE AEROBIC IDENTIFY: CPT | Performed by: NURSE PRACTITIONER

## 2023-08-15 ENCOUNTER — TRANSCRIBE ORDERS (OUTPATIENT)
Dept: CT IMAGING | Facility: HOSPITAL | Age: 58
End: 2023-08-15
Payer: OTHER GOVERNMENT

## 2023-08-15 DIAGNOSIS — R10.9 FLANK PAIN: Primary | ICD-10-CM

## 2023-08-16 ENCOUNTER — HOSPITAL ENCOUNTER (OUTPATIENT)
Dept: CT IMAGING | Facility: HOSPITAL | Age: 58
Discharge: HOME | End: 2023-08-16
Admitting: STUDENT IN AN ORGANIZED HEALTH CARE EDUCATION/TRAINING PROGRAM
Payer: OTHER GOVERNMENT

## 2023-08-16 ENCOUNTER — TELEPHONE (OUTPATIENT)
Dept: URGENT CARE | Facility: CLINIC | Age: 58
End: 2023-08-16
Payer: OTHER GOVERNMENT

## 2023-08-16 DIAGNOSIS — R10.9 FLANK PAIN: ICD-10-CM

## 2023-08-16 PROCEDURE — 74176 CT ABD & PELVIS W/O CONTRAST: CPT

## 2023-08-16 NOTE — TELEPHONE ENCOUNTER
----- Message from Jesús Arriola MD sent at 8/16/2023 10:54 AM EDT -----  Please call the patient regarding urine culture - the antibiotic given should work fine - finish course - if not better see primary

## 2023-08-17 NOTE — TELEPHONE ENCOUNTER
Called patient back, reviewed antibiotic prescribed and urine culture results.  Patient was concerned about CT results from scan today and I reviewed results showing moderate to large stool burden ongoing despite efforts to clean out after previous xray.  Recommended high fiber diet with plenty of water and if prescribed previously Miralax.  Should follow up with PCP for ongoing plan for reevaluation of this if pain is not improving.

## 2023-08-30 ENCOUNTER — LAB (OUTPATIENT)
Dept: LAB | Facility: HOSPITAL | Age: 58
End: 2023-08-30
Payer: COMMERCIAL

## 2023-08-30 ENCOUNTER — CONSULT (OUTPATIENT)
Dept: ONCOLOGY | Facility: CLINIC | Age: 58
End: 2023-08-30
Payer: OTHER GOVERNMENT

## 2023-08-30 VITALS
RESPIRATION RATE: 16 BRPM | DIASTOLIC BLOOD PRESSURE: 76 MMHG | HEART RATE: 72 BPM | SYSTOLIC BLOOD PRESSURE: 125 MMHG | TEMPERATURE: 98.2 F | HEIGHT: 65 IN | WEIGHT: 132.2 LBS | BODY MASS INDEX: 22.02 KG/M2 | OXYGEN SATURATION: 97 %

## 2023-08-30 DIAGNOSIS — C50.919 MALIGNANT NEOPLASM OF FEMALE BREAST, UNSPECIFIED ESTROGEN RECEPTOR STATUS, UNSPECIFIED LATERALITY, UNSPECIFIED SITE OF BREAST: Primary | ICD-10-CM

## 2023-08-30 DIAGNOSIS — D05.11 DUCTAL CARCINOMA IN SITU (DCIS) OF RIGHT BREAST: Primary | ICD-10-CM

## 2023-08-30 LAB
BASOPHILS # BLD AUTO: 0.08 10*3/MM3 (ref 0–0.2)
BASOPHILS NFR BLD AUTO: 1.2 % (ref 0–1.5)
DEPRECATED RDW RBC AUTO: 43.2 FL (ref 37–54)
EOSINOPHIL # BLD AUTO: 0.16 10*3/MM3 (ref 0–0.4)
EOSINOPHIL NFR BLD AUTO: 2.3 % (ref 0.3–6.2)
ERYTHROCYTE [DISTWIDTH] IN BLOOD BY AUTOMATED COUNT: 12.6 % (ref 12.3–15.4)
HCT VFR BLD AUTO: 36.5 % (ref 34–46.6)
HGB BLD-MCNC: 12.7 G/DL (ref 12–15.9)
IMM GRANULOCYTES # BLD AUTO: 0.05 10*3/MM3 (ref 0–0.05)
IMM GRANULOCYTES NFR BLD AUTO: 0.7 % (ref 0–0.5)
LYMPHOCYTES # BLD AUTO: 2.47 10*3/MM3 (ref 0.7–3.1)
LYMPHOCYTES NFR BLD AUTO: 35.5 % (ref 19.6–45.3)
MCH RBC QN AUTO: 32.3 PG (ref 26.6–33)
MCHC RBC AUTO-ENTMCNC: 34.8 G/DL (ref 31.5–35.7)
MCV RBC AUTO: 92.9 FL (ref 79–97)
MONOCYTES # BLD AUTO: 0.72 10*3/MM3 (ref 0.1–0.9)
MONOCYTES NFR BLD AUTO: 10.4 % (ref 5–12)
NEUTROPHILS NFR BLD AUTO: 3.47 10*3/MM3 (ref 1.7–7)
NEUTROPHILS NFR BLD AUTO: 49.9 % (ref 42.7–76)
NRBC BLD AUTO-RTO: 0 /100 WBC (ref 0–0.2)
PLATELET # BLD AUTO: 246 10*3/MM3 (ref 140–450)
PMV BLD AUTO: 10.1 FL (ref 6–12)
RBC # BLD AUTO: 3.93 10*6/MM3 (ref 3.77–5.28)
WBC NRBC COR # BLD: 6.95 10*3/MM3 (ref 3.4–10.8)

## 2023-08-30 PROCEDURE — 85025 COMPLETE CBC W/AUTO DIFF WBC: CPT

## 2023-08-30 PROCEDURE — 36415 COLL VENOUS BLD VENIPUNCTURE: CPT

## 2023-08-30 NOTE — PROGRESS NOTES
Subjective     REASON FOR CONSULTATION: Follow-up of DCIS patient on tamoxifen prevention  Provide an opinion on any further workup or treatment                             REQUESTING PHYSICIAN:  Dread GARCIA    RECORDS OBTAINED:  Records of the patients history including those obtained from the referring provider were reviewed and summarized in detail.    HISTORY OF PRESENT ILLNESS:  The patient is a 58 y.o. year old female who is here for an opinion about the above issue.    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    At that time we did an extended 108 gene [employer] panel because of her strong family history of malignancy and this was thankfully benign    We did a bone density which was normal    We then discussed the use of tamoxifen either low-dose or full dose for prevention and when I saw her last she was going to think about this.  Since she is aware when she subsequently [facility] and in March 2020 she decided to start tamoxifen 20 mg daily    She has been on now for 3 years and is tolerating it fairly well overall.  She has had some easy bruising but I think this is from her baby aspirin and she reports some brittle nails which may be related to iron deficiency which needs to be checked.    She is up-to-date with Pap smears and colonoscopies and her mammogram is due again in November of this year    She has transferred her care back to our office because [facility] are unable to keep a consistent provider for her    Past Medical History:   Diagnosis Date    Anxiety     Breast cancer     RIGHT     History of irregular heartbeat     IBS (irritable bowel syndrome)     Lower back pain     PONV (postoperative nausea and vomiting)     Tinnitus         Past Surgical  History:   Procedure Laterality Date    BREAST BIOPSY       Left-benign  Right-malignant    BREAST LUMPECTOMY Right 2019    Procedure: RIGHT BREAST LUMPECTOMY WITH NEEDLE LOCALIZATION;  Surgeon: Ervin Myers MD;  Location: [facility];  Service: General    COLONOSCOPY      ,     COLONOSCOPY N/A 2022    Procedure: COLONOSCOPY WITH BIOPSIES;  Surgeon: Frantz Krishna MD;  Location: [facility] ENDOSCOPY;  Service: Gastroenterology;  Laterality: N/A;  DIVERTICULOSIS, HEMORRHOIDS    ENDOSCOPY N/A 2022    Procedure: ESOPHAGOGASTRODUODENOSCOPY WITH BIOPSIES;  Surgeon: Frantz Krishna MD;  Location: [facility] ENDOSCOPY;  Service: Gastroenterology;  Laterality: N/A;  HIATAL HERNIA    UPPER GASTROINTESTINAL ENDOSCOPY           ONC HISTORY  patient is a 54-year-old female with long history of irritable bowel syndrome who was noted on her routine 3D mammogram this year to have an faint indistinct microcalcifications in the right breast that was not seen a year previously.  This led to a diagnostic mammogram and stereotactic biopsy on 2019 which revealed Intermediate grade DCIS cribriform type with focal necrosis measuring 4mm with a minute intraductal papilloma % WI 12% .  Patient was referred to Dr. Myers and underwent needle localization and lumpectomy on 2019 with the findings of a 1.6 mm area of intermediate grade DCIS.  Margins were close and reexcised in the superior margin there with an additional 0.5 mm area of micropapillary DCIS again with a clear margin although only 1.5 mm.  She has done well postoperatively and is here to discuss any additional treatment options  Patient reports a cyst biopsied in her left breast which was benign in      She is  1 para 1 menarche was at age 11 menopause at 48 she is been on no hormone replacement therapy first childbirth was at age 20 she did not breast-feed  Family history is positive for father  diagnosed with prostate cancer at age 58  at 76 of metastatic prostate cancer mother had a brain cancer and  at age 63 she has 1 brother and 2 sisters were healthy she has 2 paternal aunts with breast cancer in her 60s a paternal aunt with colon cancer in her 60s and 3 paternal uncles with prostate cancer in her 60s for a total of 7 out of 11 siblings on her father's side with malignancy     Genetic testing completely negative 108 gene panel     Current Outpatient Medications on File Prior to Visit   Medication Sig Dispense Refill    aspirin 81 MG chewable tablet Chew 1 tablet Daily. Last dose       hydrocortisone (ANUSOL-HC) 25 MG suppository 1 suppository As Needed.      mirtazapine (REMERON) 15 MG tablet Take 2 tablets by mouth Every Night.  1    tamoxifen (NOLVADEX) 10 MG tablet Take 1 tablet by mouth Daily.      vitamin D (ERGOCALCIFEROL) 1.25 MG (36697 UT) capsule capsule Take 1 capsule by mouth Every 7 (Seven) Days. friday       No current facility-administered medications on file prior to visit.        ALLERGIES:  No Known Allergies     Social History     Socioeconomic History    Marital status:      Spouse name: Edwige    Number of children: 1    Years of education: College   Tobacco Use    Smoking status: Former     Packs/day: 0.50     Years: 30.00     Pack years: 15.00     Types: Cigarettes     Quit date:      Years since quitting: 15.6    Smokeless tobacco: Never   Vaping Use    Vaping Use: Never used   Substance and Sexual Activity    Alcohol use: No    Drug use: No    Sexual activity: Never        Family History   Problem Relation Age of Onset    Depression Mother     Brain cancer Mother     Prostate cancer Father     Breast cancer Paternal Aunt     Breast cancer Paternal Aunt     Colon cancer Paternal Aunt     Prostate cancer Paternal Uncle     Prostate cancer Paternal Uncle     Throat cancer Paternal Uncle     Cancer Paternal Grandfather         eye    Malig Hyperthermia  "Neg Hx         Review of Systems   Skin:         Brittle nails   Neurological: Negative.    Hematological:  Bruises/bleeds easily.      Objective     Vitals:    08/30/23 1411   BP: 125/76   Pulse: 72   Resp: 16   Temp: 98.2 øF (36.8 øC)   TempSrc: Temporal   SpO2: 97%   Weight: 60 kg (132 lb 3.2 oz)   Height: 165.1 cm (65\")   PainSc: 0-No pain         8/30/2023     2:06 PM   Current Status   ECOG score 0       Physical Exam      CONSTITUTIONAL:  Vital signs reviewed.  No distress, looks comfortable.  EYES:  Conjunctivae and lids unremarkable.  PERRLA  EARS,NOSE,MOUTH,THROAT:  Ears and nose appear unremarkable.  Lips, teeth, gums appear unremarkable.  RESPIRATORY:  Normal respiratory effort.  Lungs clear to auscultation bilaterally.  BREASTS: Both breasts are benign with no palpable masses right lumpectomy scar benign  CARDIOVASCULAR:  Normal S1, S2.  No murmurs rubs or gallops.  No significant lower extremity edema.  GASTROINTESTINAL: Abdomen appears unremarkable.  Nontender.  No hepatomegaly.  No splenomegaly.  LYMPHATIC:  No cervical, supraclavicular, axillary lymphadenopathy.  SKIN:  Warm.  No rashes.  PSYCHIATRIC:  Normal judgment and insight.  Normal mood and affect.      RECENT LABS:  Hematology WBC   Date Value Ref Range Status   08/30/2023 6.95 3.40 - 10.80 10*3/mm3 Final     RBC   Date Value Ref Range Status   08/30/2023 3.93 3.77 - 5.28 10*6/mm3 Final     Hemoglobin   Date Value Ref Range Status   08/30/2023 12.7 12.0 - 15.9 g/dL Final     Hematocrit   Date Value Ref Range Status   08/30/2023 36.5 34.0 - 46.6 % Final     Platelets   Date Value Ref Range Status   08/30/2023 246 140 - 450 10*3/mm3 Final          Assessment & Plan     1. Intermediate grade DCIS  ER [place] positive postlumpectomy and radiation  10 mg tamoxifen initiated in March 2020    2.  Chronic irritable bowel syndrome    3 positive paternal family history of.  Multiple malignancies-extended genetic testing negative 108 genes    4.  Normal " bone density 2019    Plan  1.  Continue tamoxifen 10 mg daily  2.  Mammogram in November  3.  Continue with annual Pap smears  4.  See me in 1 year for follow-up

## 2023-09-05 ENCOUNTER — TELEPHONE (OUTPATIENT)
Dept: PSYCHIATRY | Facility: HOSPITAL | Age: 58
End: 2023-09-05
Payer: OTHER GOVERNMENT

## 2023-09-05 NOTE — TELEPHONE ENCOUNTER
"Distress Screening Follow-up    Diagnosis: Intermediate Grade DCIS ER ND Positive; Post lumpectomy and radiation    Location of Distress Screening: Medical Oncology    Distress Level: 4 (8/30/2023  2:00 PM)    Physical Concerns: At the time of completing the Distress Screening the patient had a kidney infection and she attributes her answers to that diagnosis.     Pain: Y (back pain)  Sleep: Y      Emotional Concerns: The patient shared that she is currently seeing and therapist and she is \"good.\"       Comments: The patient appreciated the call and the opportunity to take advantage of support services, but declines need at this time.      "

## 2023-12-01 ENCOUNTER — HOSPITAL ENCOUNTER (OUTPATIENT)
Dept: MAMMOGRAPHY | Facility: HOSPITAL | Age: 58
Discharge: HOME | End: 2023-12-01
Admitting: INTERNAL MEDICINE
Payer: OTHER GOVERNMENT

## 2023-12-01 DIAGNOSIS — D05.11 DUCTAL CARCINOMA IN SITU (DCIS) OF RIGHT BREAST: ICD-10-CM

## 2023-12-01 PROCEDURE — 77067 SCR MAMMO BI INCL CAD: CPT

## 2023-12-01 PROCEDURE — 77063 BREAST TOMOSYNTHESIS BI: CPT

## 2024-02-05 ENCOUNTER — APPOINTMENT (OUTPATIENT)
Dept: CT IMAGING | Facility: HOSPITAL | Age: 59
End: 2024-02-05
Payer: OTHER GOVERNMENT

## 2024-02-05 ENCOUNTER — HOSPITAL ENCOUNTER (EMERGENCY)
Facility: HOSPITAL | Age: 59
Discharge: HOME | End: 2024-02-05
Attending: EMERGENCY MEDICINE | Admitting: EMERGENCY MEDICINE
Payer: OTHER GOVERNMENT

## 2024-02-05 VITALS
RESPIRATION RATE: 18 BRPM | WEIGHT: 135.8 LBS | BODY MASS INDEX: 22.63 KG/M2 | DIASTOLIC BLOOD PRESSURE: 73 MMHG | HEIGHT: 65 IN | OXYGEN SATURATION: 100 % | SYSTOLIC BLOOD PRESSURE: 136 MMHG | HEART RATE: 83 BPM | TEMPERATURE: 98.2 F

## 2024-02-05 DIAGNOSIS — R31.9 HEMATURIA, UNSPECIFIED TYPE: Primary | ICD-10-CM

## 2024-02-05 LAB
ALBUMIN SERPL-MCNC: 3.8 G/DL (ref 3.5–5.2)
ALBUMIN/GLOB SERPL: 1.4 G/DL
ALP SERPL-CCNC: 81 U/L (ref 39–117)
ALT SERPL W P-5'-P-CCNC: 9 U/L (ref 1–33)
ANION GAP SERPL CALCULATED.3IONS-SCNC: 8.8 MMOL/L (ref 5–15)
AST SERPL-CCNC: 15 U/L (ref 1–32)
BACTERIA UR QL AUTO: NORMAL /HPF
BASOPHILS # BLD AUTO: 0.07 10*3/MM3 (ref 0–0.2)
BASOPHILS NFR BLD AUTO: 1.1 % (ref 0–1.5)
BILIRUB SERPL-MCNC: 0.2 MG/DL (ref 0–1.2)
BILIRUB UR QL STRIP: NEGATIVE
BUN SERPL-MCNC: 7 MG/DL (ref 8–23)
BUN/CREAT SERPL: 9.5 (ref 7–25)
CALCIUM SPEC-SCNC: 8.8 MG/DL (ref 8.6–10.5)
CHLORIDE SERPL-SCNC: 103 MMOL/L (ref 98–107)
CLARITY UR: CLEAR
CO2 SERPL-SCNC: 27.2 MMOL/L (ref 22–29)
COLOR UR: YELLOW
CREAT SERPL-MCNC: 0.74 MG/DL (ref 0.57–1)
DEPRECATED RDW RBC AUTO: 43.1 FL (ref 37–54)
EGFRCR SERPLBLD CKD-EPI 2021: 92.8 ML/MIN/1.73
EOSINOPHIL # BLD AUTO: 0.14 10*3/MM3 (ref 0–0.4)
EOSINOPHIL NFR BLD AUTO: 2.2 % (ref 0.3–6.2)
ERYTHROCYTE [DISTWIDTH] IN BLOOD BY AUTOMATED COUNT: 12.5 % (ref 12.3–15.4)
GLOBULIN UR ELPH-MCNC: 2.7 GM/DL
GLUCOSE SERPL-MCNC: 96 MG/DL (ref 65–99)
GLUCOSE UR STRIP-MCNC: NEGATIVE MG/DL
HCT VFR BLD AUTO: 36.2 % (ref 34–46.6)
HGB BLD-MCNC: 12 G/DL (ref 12–15.9)
HGB UR QL STRIP.AUTO: ABNORMAL
HYALINE CASTS UR QL AUTO: NORMAL /LPF
IMM GRANULOCYTES # BLD AUTO: 0.01 10*3/MM3 (ref 0–0.05)
IMM GRANULOCYTES NFR BLD AUTO: 0.2 % (ref 0–0.5)
KETONES UR QL STRIP: NEGATIVE
LEUKOCYTE ESTERASE UR QL STRIP.AUTO: NEGATIVE
LYMPHOCYTES # BLD AUTO: 2.23 10*3/MM3 (ref 0.7–3.1)
LYMPHOCYTES NFR BLD AUTO: 35.8 % (ref 19.6–45.3)
MCH RBC QN AUTO: 31.1 PG (ref 26.6–33)
MCHC RBC AUTO-ENTMCNC: 33.1 G/DL (ref 31.5–35.7)
MCV RBC AUTO: 93.8 FL (ref 79–97)
MONOCYTES # BLD AUTO: 0.6 10*3/MM3 (ref 0.1–0.9)
MONOCYTES NFR BLD AUTO: 9.6 % (ref 5–12)
NEUTROPHILS NFR BLD AUTO: 3.18 10*3/MM3 (ref 1.7–7)
NEUTROPHILS NFR BLD AUTO: 51.1 % (ref 42.7–76)
NITRITE UR QL STRIP: NEGATIVE
NRBC BLD AUTO-RTO: 0 /100 WBC (ref 0–0.2)
PH UR STRIP.AUTO: 7.5 [PH] (ref 5–8)
PLATELET # BLD AUTO: 265 10*3/MM3 (ref 140–450)
PMV BLD AUTO: 10.5 FL (ref 6–12)
POTASSIUM SERPL-SCNC: 4.3 MMOL/L (ref 3.5–5.2)
PROT SERPL-MCNC: 6.5 G/DL (ref 6–8.5)
PROT UR QL STRIP: NEGATIVE
RBC # BLD AUTO: 3.86 10*6/MM3 (ref 3.77–5.28)
RBC # UR STRIP: NORMAL /HPF
REF LAB TEST METHOD: NORMAL
SODIUM SERPL-SCNC: 139 MMOL/L (ref 136–145)
SP GR UR STRIP: <=1.005 (ref 1–1.03)
SQUAMOUS #/AREA URNS HPF: NORMAL /HPF
UROBILINOGEN UR QL STRIP: ABNORMAL
WBC # UR STRIP: NORMAL /HPF
WBC NRBC COR # BLD AUTO: 6.23 10*3/MM3 (ref 3.4–10.8)

## 2024-02-05 PROCEDURE — 85025 COMPLETE CBC W/AUTO DIFF WBC: CPT | Performed by: EMERGENCY MEDICINE

## 2024-02-05 PROCEDURE — 25810000003 SODIUM CHLORIDE 0.9 % SOLUTION: Performed by: EMERGENCY MEDICINE

## 2024-02-05 PROCEDURE — 99285 EMERGENCY DEPT VISIT HI MDM: CPT

## 2024-02-05 PROCEDURE — 74177 CT ABD & PELVIS W/CONTRAST: CPT

## 2024-02-05 PROCEDURE — 80053 COMPREHEN METABOLIC PANEL: CPT | Performed by: EMERGENCY MEDICINE

## 2024-02-05 PROCEDURE — 25510000001 IOPAMIDOL PER 1 ML: Performed by: EMERGENCY MEDICINE

## 2024-02-05 PROCEDURE — 81001 URINALYSIS AUTO W/SCOPE: CPT | Performed by: EMERGENCY MEDICINE

## 2024-02-05 RX ADMIN — SODIUM CHLORIDE 1000 ML: 9 INJECTION, SOLUTION INTRAVENOUS at 14:34

## 2024-02-05 RX ADMIN — IOPAMIDOL 100 ML: 755 INJECTION, SOLUTION INTRAVENOUS at 15:29

## 2024-02-05 NOTE — ED PROVIDER NOTES
Time: 1:26 PM EST  Date of encounter:  2/5/2024  Independent Historian/Clinical History and Information was obtained by:   Patient    History is limited by: N/A    Chief Complaint: Hematuria      History of Present Illness:  Patient is a 58 y.o. year old female who presents to the emergency department for evaluation of hematuria this been persistent for the past week.  Patient does report dysuria.  Patient has no fever or chills.  Patient has no chest pain or shortness of breath.  Patient is no cough hemoptysis.  Patient denies nausea, vomiting, and diaphoresis.    HPI    Patient Care Team  Primary Care Provider: Meg Monterroso MD    Past Medical History:     No Known Allergies  Past Medical History:   Diagnosis Date    Anxiety     Breast cancer     RIGHT     History of irregular heartbeat     IBS (irritable bowel syndrome)     Lower back pain     PONV (postoperative nausea and vomiting)     Tinnitus      Past Surgical History:   Procedure Laterality Date    BREAST BIOPSY      1992 Left-benign 2019 Right-malignant    BREAST LUMPECTOMY Right 07/24/2019    Procedure: RIGHT BREAST LUMPECTOMY WITH NEEDLE LOCALIZATION;  Surgeon: Ervin Myers MD;  Location: [facility];  Service: General    COLONOSCOPY      2002, 2014    COLONOSCOPY N/A 11/17/2022    Procedure: COLONOSCOPY WITH BIOPSIES;  Surgeon: Frantz Krishna MD;  Location: [facility] ENDOSCOPY;  Service: Gastroenterology;  Laterality: N/A;  DIVERTICULOSIS, HEMORRHOIDS    ENDOSCOPY N/A 11/17/2022    Procedure: ESOPHAGOGASTRODUODENOSCOPY WITH BIOPSIES;  Surgeon: Frantz Krishna MD;  Location: [facility] ENDOSCOPY;  Service: Gastroenterology;  Laterality: N/A;  HIATAL HERNIA    UPPER GASTROINTESTINAL ENDOSCOPY  2014     Family History   Problem Relation Age of Onset    Depression Mother     Brain cancer Mother     Prostate cancer Father     Breast cancer Maternal Aunt     Breast cancer Paternal Aunt     Breast cancer Paternal Aunt     Colon cancer  Paternal Aunt     Prostate cancer Paternal Uncle     Prostate cancer Paternal Uncle     Throat cancer Paternal Uncle     Cancer Paternal Grandfather         eye    Malig Hyperthermia Neg Hx        Home Medications:  Prior to Admission medications    Medication Sig Start Date End Date Taking? Authorizing Provider   aspirin 81 MG chewable tablet Chew 1 tablet Daily. Last dose     Scot Ford MD   hydrocortisone (ANUSOL-HC) 25 MG suppository 1 suppository As Needed. 22   Scot Ford MD   mirtazapine (REMERON) 15 MG tablet Take 2 tablets by mouth Every Night. 19   Scot Ford MD   tamoxifen (NOLVADEX) 10 MG tablet Take 1 tablet by mouth Daily. 22   Scot Ford MD   vitamin D (ERGOCALCIFEROL) 1.25 MG (42919 UT) capsule capsule Take 1 capsule by mouth Every 7 (Seven) Days. 22   Scot Ford MD        Social History:   Social History     Tobacco Use    Smoking status: Former     Packs/day: 0.50     Years: 30.00     Additional pack years: 0.00     Total pack years: 15.00     Types: Cigarettes     Quit date:      Years since quittin.1    Smokeless tobacco: Never   Vaping Use    Vaping Use: Never used   Substance Use Topics    Alcohol use: No    Drug use: No         Review of Systems:  Review of Systems   Constitutional:  Negative for chills and fever.   HENT:  Negative for congestion, rhinorrhea and sore throat.    Eyes:  Negative for pain and visual disturbance.   Respiratory:  Negative for apnea, cough, chest tightness and shortness of breath.    Cardiovascular:  Negative for chest pain and palpitations.   Gastrointestinal:  Negative for abdominal pain, diarrhea, nausea and vomiting.   Genitourinary:  Positive for hematuria. Negative for difficulty urinating and dysuria.   Musculoskeletal:  Negative for joint swelling and myalgias.   Skin:  Negative for color change.   Neurological:  Negative for seizures and headaches.  "  Psychiatric/Behavioral: Negative.     All other systems reviewed and are negative.       Physical Exam:  /73   Pulse 83   Temp 98.2 °F (36.8 °C) (Oral)   Resp 18   Ht 165.1 cm (65\")   Wt 61.6 kg (135 lb 12.9 oz)   SpO2 100%   BMI 22.60 kg/m²     Physical Exam  Vitals and nursing note reviewed.   Constitutional:       General: She is not in acute distress.     Appearance: Normal appearance. She is not toxic-appearing.   HENT:      Head: Normocephalic and atraumatic.      Jaw: There is normal jaw occlusion.   Eyes:      General: Lids are normal.      Extraocular Movements: Extraocular movements intact.      Conjunctiva/sclera: Conjunctivae normal.      Pupils: Pupils are equal, round, and reactive to light.   Cardiovascular:      Rate and Rhythm: Normal rate and regular rhythm.      Pulses: Normal pulses.      Heart sounds: Normal heart sounds.   Pulmonary:      Effort: Pulmonary effort is normal. No respiratory distress.      Breath sounds: Normal breath sounds. No wheezing or rhonchi.   Abdominal:      General: Abdomen is flat.      Palpations: Abdomen is soft.      Tenderness: There is no abdominal tenderness. There is no guarding or rebound.   Musculoskeletal:         General: Normal range of motion.      Cervical back: Normal range of motion and neck supple.      Right lower leg: No edema.      Left lower leg: No edema.   Skin:     General: Skin is warm and dry.   Neurological:      Mental Status: She is alert and oriented to person, place, and time. Mental status is at baseline.   Psychiatric:         Mood and Affect: Mood normal.                  Procedures:  Procedures      Medical Decision Making:      Comorbidities that affect care:    Breast cancer    External Notes reviewed:    Previous Clinic Note: Patient was last seen in clinic and has been taking tamoxifen      The following orders were placed and all results were independently analyzed by me:  Orders Placed This Encounter   Procedures "    CT Abdomen Pelvis With Contrast    Comprehensive Metabolic Panel    Urinalysis With Microscopic If Indicated (No Culture) - Urine, Clean Catch    CBC Auto Differential    Urinalysis, Microscopic Only - Urine, Clean Catch    CBC & Differential       Medications Given in the Emergency Department:  Medications   sodium chloride 0.9 % bolus 1,000 mL (1,000 mL Intravenous New Bag 2/5/24 1434)   iopamidol (ISOVUE-370) 76 % injection 100 mL (100 mL Intravenous Given 2/5/24 1529)        ED Course:         Labs:    Lab Results (last 24 hours)       Procedure Component Value Units Date/Time    Urinalysis With Microscopic If Indicated (No Culture) - Urine, Clean Catch [[id number]]  (Abnormal) Collected: 02/05/24 1332    Specimen: Urine, Clean Catch Updated: 02/05/24 1355     Color, UA Yellow     Appearance, UA Clear     pH, UA 7.5     Specific Gravity, UA <=1.005     Glucose, UA Negative     Ketones, UA Negative     Bilirubin, UA Negative     Blood, UA Trace     Protein, UA Negative     Leuk Esterase, UA Negative     Nitrite, UA Negative     Urobilinogen, UA 0.2 E.U./dL    Urinalysis, Microscopic Only - Urine, Clean Catch [[id number]] Collected: 02/05/24 1332    Specimen: Urine, Clean Catch Updated: 02/05/24 1359     RBC, UA 0-2 /HPF      WBC, UA 0-2 /HPF      Bacteria, UA None Seen /HPF      Squamous Epithelial Cells, UA 0-2 /HPF      Hyaline Casts, UA None Seen /LPF      Methodology Automated Microscopy    CBC & Differential [[id number]]  (Normal) Collected: 02/05/24 1433    Specimen: Blood Updated: 02/05/24 1451    Narrative:      The following orders were created for panel order CBC & Differential.  Procedure                               Abnormality         Status                     ---------                               -----------         ------                     CBC Auto Differential[[id number]]        Normal              Final result                 Please view results for these tests on the individual orders.     Comprehensive Metabolic Panel [[id number]]  (Abnormal) Collected: 02/05/24 1433    Specimen: Blood Updated: 02/05/24 1515     Glucose 96 mg/dL      BUN 7 mg/dL      Creatinine 0.74 mg/dL      Sodium 139 mmol/L      Potassium 4.3 mmol/L      Chloride 103 mmol/L      CO2 27.2 mmol/L      Calcium 8.8 mg/dL      Total Protein 6.5 g/dL      Albumin 3.8 g/dL      ALT (SGPT) 9 U/L      AST (SGOT) 15 U/L      Alkaline Phosphatase 81 U/L      Total Bilirubin 0.2 mg/dL      Globulin 2.7 gm/dL      A/G Ratio 1.4 g/dL      BUN/Creatinine Ratio 9.5     Anion Gap 8.8 mmol/L      eGFR 92.8 mL/min/1.73     Narrative:      GFR Normal >60  Chronic Kidney Disease <60  Kidney Failure <15      CBC Auto Differential [[id number]]  (Normal) Collected: 02/05/24 1433    Specimen: Blood Updated: 02/05/24 1451     WBC 6.23 10*3/mm3      RBC 3.86 10*6/mm3      Hemoglobin 12.0 g/dL      Hematocrit 36.2 %      MCV 93.8 fL      MCH 31.1 pg      MCHC 33.1 g/dL      RDW 12.5 %      RDW-SD 43.1 fl      MPV 10.5 fL      Platelets 265 10*3/mm3      Neutrophil % 51.1 %      Lymphocyte % 35.8 %      Monocyte % 9.6 %      Eosinophil % 2.2 %      Basophil % 1.1 %      Immature Grans % 0.2 %      Neutrophils, Absolute 3.18 10*3/mm3      Lymphocytes, Absolute 2.23 10*3/mm3      Monocytes, Absolute 0.60 10*3/mm3      Eosinophils, Absolute 0.14 10*3/mm3      Basophils, Absolute 0.07 10*3/mm3      Immature Grans, Absolute 0.01 10*3/mm3      nRBC 0.0 /100 WBC              Imaging:    CT Abdomen Pelvis With Contrast    Result Date: 2/5/2024  PROCEDURE: CT ABDOMEN PELVIS W CONTRAST  COMPARISON: [facility], CT, CT ABDOMEN PELVIS STONE PROTOCOL, 8/16/2023, 15:27.  INDICATIONS: abdominal pain, hermaturia  TECHNIQUE: CT images were created with non-ionic intravenous contrast material.   PROTOCOL:   Standard imaging protocol performed    RADIATION:   DLP: 496.4 mGy*cm   Automated exposure control was utilized to minimize radiation dose.  CONTRAST: 100 cc Isovue 370 I.V.  FINDINGS:  The lung bases are clear.  The liver is of normal size.  The liver is of diffusely diminished density consistent with mild fatty infiltration.  The gallbladder is not abnormally distended.  No pancreatic or adrenal mass is evident.  The spleen is of normal size.  The kidneys enhance bilaterally.  1 cm Bosniak 1 cyst is seen in the upper pole left kidney.  No renal or ureteral stones are seen.  There is no evidence of hydronephrosis.  The urinary bladder is not abnormally distended.  The cause of the patient's hematuria is not evident.  The uterus measures 4.9 cm in greatest transverse dimension.  The ovaries each measure 2.5 cm in greatest dimension.  No pelvic mass is seen.  Bowel loops are not abnormally dilated.  The appendix is normal.  Mild diverticulosis of the sigmoid colon is evident.  Small umbilical hernia containing fat is stable.  Mild degenerative spurring is seen in the lower thoracic and lumbar spine.  CONTINUED ON NEXT PAGE...          CT scan of the abdomen and pelvis with IV contrast demonstrating fatty liver.  Mild diverticulosis of the sigmoid colon without diverticulitis.     ALIVIA BOLTON MD       Electronically Signed and Approved By: ALIVIA BOLTON MD on 2/05/2024 at 16:11                Differential Diagnosis and Discussion:    Hematuria: Differential diagnosis includes but is not limited to medications, coagulopathy, glomerulonephritis, nephritis, neoplasm, vascular abnormalities, cystitis, urethritis, neoplasms of the bladder, and autoimmune disorders.    All labs were reviewed and interpreted by me.  CT scan radiology impression was interpreted by me.    MDM     Amount and/or Complexity of Data Reviewed  Clinical lab tests: reviewed  Tests in the radiology section of CPT®: reviewed    The patient´s CBC that was reviewed and interpreted by me shows no abnormalities of critical concern. Of note, there is no anemia requiring a blood transfusion  and the platelet count is acceptable.  The patient´s CMP that was reviewed and interpretted by me shows no abnormalities of critical concern. Of note, the patient´s sodium and potassium are acceptable. The patient´s liver enzymes are unremarkable. The patient´s renal function (creatinine) is preserved. The patient has a normal anion gap.  Urinalysis is negative for bacteriuria.  It does show trace blood.  CT scan shows a cyst in the kidney.  The patient was advised to follow-up with urology in the next 2 to 3 days.  She states that she has seen Dr. Oviedo in the past.            Patient Care Considerations:    I considered consulting urology, however the patient has no neurological emergencies in the department and the patient is stable for follow-up.      Consultants/Shared Management Plan:    None    Social Determinants of Health:    Patient is independent, reliable, and has access to care.       Disposition and Care Coordination:    Discharged: I considered escalation of care by admitting this patient to the hospital, however the patient has improved and is suitable and  stable for discharge.        Final diagnoses:   Hematuria, unspecified type        ED Disposition       ED Disposition   Discharge    Condition   Stable    Comment   --               This medical record created using voice recognition software.             Bennie Lopez MD  02/05/24 [id number]

## 2024-02-06 ENCOUNTER — TELEPHONE (OUTPATIENT)
Dept: OBSTETRICS AND GYNECOLOGY | Facility: CLINIC | Age: 59
End: 2024-02-06
Payer: OTHER GOVERNMENT

## 2024-02-06 NOTE — TELEPHONE ENCOUNTER
Patient has contacted the office today regarding a sooner appointment.  States she will be out of town on February 7, 15 and 16th.  Appointment had been scheduled with Dr Durant on 2/15.  Appointment has been rescheduled.  Patient has been placed on my watch list for any cancelled appointments with Dr Durant.  Patient states her bleeding has been intermittent for the past week but has happened before.  Patient was seen in the ED 2/5/24.  Findings reviewed.

## 2024-02-21 NOTE — PROGRESS NOTES
"GYN Visit    Chief Complaint   Patient presents with    Follow-up     Blood in urine       HPI:   58 y.o. LMP: No LMP recorded. Patient is postmenopausal.     Social History     Substance and Sexual Activity   Sexual Activity Not Currently    Partners: Male    Birth control/protection: Post-menopausal       CT Abdomen Pelvis With Contrast (2024 15:28) diverticulosis, normal gynecologic.  Small kidney cyst.   ED with Bennie Lopez MD (2024) seen in the ER for hematuria    Urinalysis With Microscopic If Indicated (No Culture) - Urine, Clean Catch (2024 13:32)- trace blood  Urinalysis, Microscopic Only - Urine, Clean Catch (2024 13:32) no RBC  Comprehensive Metabolic Panel (2024 14:33) wnl  CBC & Differential (2024 14:33) wnl    Breast cancer survivor, on tamoxifen (yr 4 of 5years)    Recommended to see GYN by ER due to blood in urine.  Had had several episodes of hematuria.  Denies any vag bleeding.  Has seen Dr. Zapata in past has michelle Zapata .   No pelvic pain.  Not having intercourse.  No gyn complaints.     [facility] by report, but \"hpv only\".       History: PMHx, Meds, Allergies, PSHx, Social Hx, and POBHx all reviewed and updated.    PHYSICAL EXAM:  /66   Ht 165.1 cm (65\")   Wt 61.6 kg (135 lb 12.8 oz)   BMI 22.60 kg/m²   Facility age limit for growth %rich is 20 years.   General- NAD, alert and oriented, appropriate  Psych- Normal mood, good memory     Chaperone present during pelvic exam.  External genitalia- Normal female, no lesions  Urethra/meatus- Normal, no masses, non tender, no prolapse  Bladder- Normal, no masses, non tender, no prolapse  Vagina- Normal, atrophy, no lesions, no discharge, no prolapse  Cervix- Normal, no lesions, no discharge, No cervical motion tenderness  Uterus- Normal size, shape & consistency.  Non tender, mobile.   Adnexa- No mass, non tender    Lymphatic- No palpable groin " nodes  Extremities- No edema, no cyanosis    Skin- No lesions, no rashes      ASSESSMENT AND PLAN:  Diagnoses and all orders for this visit:    1. Hematuria, unspecified type (Primary)    Records release for pap results.  Rec pt call our office if haven't heard in a week.     We discussed without any gynecologic complaints, a pelvic ultrasound and/or endometrial biopsy is not necessary.  We discussed in detail if there is any concern at all that this is not blood in her urine and possibly could be vaginal etiology, then I would recommend a transvaginal ultrasound and possible endometrial biopsy depending on results.  Patient is 100% sure blood is coming from her urine.    Patient also notes today that her oncologist has recommended yearly Pap smears because of her breast cancer history.  We discussed in detail that Pap smears are for cervical cancer screening and there is no need for increased frequency surveillance for cervical cancer with a history of breast cancer.  I suspect her oncologist recommends a yearly pelvic exam with gynecology, however I do strongly recommend that if her oncologist continues to recommend yearly Pap smears, that she contact me and we can discuss further so I can understand what screening they feel is necessary.  Patient will do that and let me know.      Follow Up:  Return any GYN concerns.            Shy Durant DO  02/26/2024    [facility]  [facility] [place] [address]  Dept: [phone]  Dept Fax: [phone]  Loc: [phone]  Loc Fax: [phone]

## 2024-02-26 ENCOUNTER — OFFICE VISIT (OUTPATIENT)
Dept: OBSTETRICS AND GYNECOLOGY | Facility: CLINIC | Age: 59
End: 2024-02-26
Payer: OTHER GOVERNMENT

## 2024-02-26 VITALS
BODY MASS INDEX: 22.63 KG/M2 | DIASTOLIC BLOOD PRESSURE: 66 MMHG | HEIGHT: 65 IN | SYSTOLIC BLOOD PRESSURE: 104 MMHG | WEIGHT: 135.8 LBS

## 2024-02-26 DIAGNOSIS — R31.9 HEMATURIA, UNSPECIFIED TYPE: Primary | ICD-10-CM

## 2024-02-26 PROCEDURE — 99203 OFFICE O/P NEW LOW 30 MIN: CPT | Performed by: OBSTETRICS & GYNECOLOGY

## 2024-02-28 ENCOUNTER — PATIENT ROUNDING (BHMG ONLY) (OUTPATIENT)
Dept: OBSTETRICS AND GYNECOLOGY | Facility: CLINIC | Age: 59
End: 2024-02-28
Payer: OTHER GOVERNMENT

## 2024-04-15 ENCOUNTER — OFFICE VISIT (OUTPATIENT)
Dept: UROLOGY | Facility: CLINIC | Age: 59
End: 2024-04-15
Payer: OTHER GOVERNMENT

## 2024-04-15 VITALS
BODY MASS INDEX: 22.69 KG/M2 | WEIGHT: 136.2 LBS | HEIGHT: 65 IN | SYSTOLIC BLOOD PRESSURE: 121 MMHG | DIASTOLIC BLOOD PRESSURE: 70 MMHG

## 2024-04-15 DIAGNOSIS — N32.81 OAB (OVERACTIVE BLADDER): ICD-10-CM

## 2024-04-15 DIAGNOSIS — R31.9 HEMATURIA, UNSPECIFIED TYPE: Primary | ICD-10-CM

## 2024-04-15 LAB
BILIRUB BLD-MCNC: NEGATIVE MG/DL
CLARITY, POC: CLEAR
COLOR UR: YELLOW
EXPIRATION DATE: ABNORMAL
GLUCOSE UR STRIP-MCNC: NEGATIVE MG/DL
KETONES UR QL: NEGATIVE
LEUKOCYTE EST, POC: NEGATIVE
Lab: ABNORMAL
NITRITE UR-MCNC: NEGATIVE MG/ML
PH UR: 7.5 [PH] (ref 5–8)
PROT UR STRIP-MCNC: NEGATIVE MG/DL
RBC # UR STRIP: ABNORMAL /UL
SP GR UR: 1.01 (ref 1–1.03)
UROBILINOGEN UR QL: ABNORMAL

## 2024-04-15 PROCEDURE — 99204 OFFICE O/P NEW MOD 45 MIN: CPT | Performed by: UROLOGY

## 2024-04-15 PROCEDURE — 81003 URINALYSIS AUTO W/O SCOPE: CPT | Performed by: UROLOGY

## 2024-04-15 RX ORDER — OXYBUTYNIN CHLORIDE 5 MG/1
5 TABLET, EXTENDED RELEASE ORAL DAILY
Qty: 30 TABLET | Refills: 11 | Status: SHIPPED | OUTPATIENT
Start: 2024-04-15

## 2024-04-15 RX ORDER — CYANOCOBALAMIN (VITAMIN B-12) 500 MCG
TABLET ORAL
COMMUNITY
Start: 2024-03-01

## 2024-04-15 RX ORDER — FOLIC ACID 1 MG/1
TABLET ORAL
COMMUNITY
Start: 2024-03-18

## 2024-04-15 NOTE — PROGRESS NOTES
"Chief Complaint  Blood in Urine    Subjective          Jemma Inman presents to [facility]  History of Present Illness  Patient is here for an episode of gross hematuria which lasted a few urinations several weeks ago.  She also saw Dr. Durant who did not feel it was gynecological in nature.  She states this has happened 1 other time in the past.  She does have microscopic hematuria today with trace blood on her urinalysis.      She does also have frequency and urgency and some urge incontinence.  She occasionally leaks when she coughs or sneezes but this is less bothersome to her.      Objective   Vital Signs:   /70   Ht 165.1 cm (65\")   Wt 61.8 kg (136 lb 3.2 oz)   BMI 22.66 kg/m²       Physical Exam  Vitals and nursing note reviewed.   Constitutional:       Appearance: Normal appearance. She is well-developed.   Pulmonary:      Effort: Pulmonary effort is normal.      Breath sounds: Normal air entry.   Neurological:      Mental Status: She is alert and oriented to person, place, and time.      Motor: Motor function is intact.   Psychiatric:         Mood and Affect: Mood normal.         Behavior: Behavior normal.          Result Review :   The following data was reviewed by: Missy Zapata MD on 04/15/2024:    Results for orders placed or performed in visit on 04/15/24   POC Urinalysis Dipstick, Automated    Specimen: Urine   Result Value Ref Range    Color Yellow Yellow, Straw, Dark Yellow, Eugenia    Clarity, UA Clear Clear    Specific Gravity  1.015 1.005 - 1.030    pH, Urine 7.5 5.0 - 8.0    Leukocytes Negative Negative    Nitrite, UA Negative Negative    Protein, POC Negative Negative mg/dL    Glucose, UA Negative Negative mg/dL    Ketones, UA Negative Negative    Urobilinogen, UA 0.2 E.U./dL Normal, 0.2 E.U./dL    Bilirubin Negative Negative    Blood, UA Trace (A) Negative    Lot Number [id number]     Expiration Date 22,025            Study Result    Narrative & " Impression   PROCEDURE:CT ABDOMEN PELVIS W CONTRAST     COMPARISON: [facility], CT, CT ABDOMEN PELVIS STONE PROTOCOL, 8/16/2023,   15:27.     INDICATIONS:abdominal pain, hermaturia     TECHNIQUE:    CT images were created with non-ionic intravenous contrast material.       PROTOCOL:     Standard imaging protocol performed                 RADIATION:      DLP: 496.4 mGy*cm               Automated exposure control was utilized to minimize radiation dose.   CONTRAST:100 cc Isovue 370 I.V.     FINDINGS:          The lung bases are clear.     The liver is of normal size.  The liver is of diffusely diminished density consistent with mild   fatty infiltration.  The gallbladder is not abnormally distended.  No pancreatic or adrenal mass is   evident.  The spleen is of normal size.     The kidneys enhance bilaterally.  1 cm Bosniak 1 cyst is seen in the upper pole left kidney.  No   renal or ureteral stones are seen.  There is no evidence of hydronephrosis.  The urinary bladder is   not abnormally distended.  The cause of the patient's hematuria is not evident.     The uterus measures 4.9 cm in greatest transverse dimension.  The ovaries each measure 2.5 cm in   greatest dimension.  No pelvic mass is seen.     Bowel loops are not abnormally dilated.  The appendix is normal.  Mild diverticulosis of the   sigmoid colon is evident.     Small umbilical hernia containing fat is stable.  Mild degenerative spurring is seen in the lower   thoracic and lumbar spine.     CONTINUED ON NEXT PAGE...           IMPRESSION:                 CT scan of the abdomen and pelvis with IV contrast demonstrating fatty liver.     Mild diverticulosis of the sigmoid colon without diverticulitis.            ALIVIA BOLTON MD         Electronically Signed and Approved By: ALIVIA BOLTON MD on 2/05/2024 at 16:11               Assessment and Plan    Diagnoses and all orders for this visit:    1. Hematuria, unspecified type (Primary)  -      POC Urinalysis Dipstick, Automated    2. OAB (overactive bladder)  -     oxybutynin XL (DITROPAN-XL) 5 MG 24 hr tablet; Take 1 tablet by mouth Daily.  Dispense: 30 tablet; Refill: 11    She will need a cystoscopy to complete her workup for hematuria.  Her CT scan did not reveal any abnormalities of her kidneys or ureters.  I have started her on oxybutynin ER 5 mg once a day for her overactive bladder and we will discuss at her next appointment whether that is been helpful or not.        Follow Up       Return in about 4 weeks (around 5/13/2024) for Cystoscopy.  Patient was given instructions and counseling regarding her condition or for health maintenance advice. Please see specific information pulled into the AVS if appropriate.

## 2024-04-22 ENCOUNTER — TELEPHONE (OUTPATIENT)
Dept: SURGERY | Facility: CLINIC | Age: 59
End: 2024-04-22
Payer: OTHER GOVERNMENT

## 2024-04-22 NOTE — TELEPHONE ENCOUNTER
Spoke with patient informing her per Dr. Zapata other meds will be more expensive and she can use the good rx bijal to get the med cheaper.

## 2024-04-22 NOTE — TELEPHONE ENCOUNTER
Patient said that the Oxybutynin was going to be over $100. She's not sure what the alternative will be. She said she is not willing to pay that. # [phone]

## 2024-04-23 ENCOUNTER — TELEPHONE (OUTPATIENT)
Dept: UROLOGY | Facility: CLINIC | Age: 59
End: 2024-04-23
Payer: OTHER GOVERNMENT

## 2024-04-23 NOTE — TELEPHONE ENCOUNTER
[facility] received a fax that requires your attention. The document has been indexed to the patient’s chart for your review.      Reason for sending: REQUEST FOR RECORDS FROM 4.15.24 VISIT    Documents Description: [facility]24    [facility] of Sender[facility]    Date Indexed: 04.23.24    Notes (if needed):

## 2024-05-08 ENCOUNTER — PROCEDURE VISIT (OUTPATIENT)
Dept: UROLOGY | Facility: CLINIC | Age: 59
End: 2024-05-08
Payer: OTHER GOVERNMENT

## 2024-05-08 ENCOUNTER — TELEPHONE (OUTPATIENT)
Dept: UROLOGY | Facility: CLINIC | Age: 59
End: 2024-05-08
Payer: OTHER GOVERNMENT

## 2024-05-08 VITALS — RESPIRATION RATE: 12 BRPM | BODY MASS INDEX: 22.7 KG/M2 | HEIGHT: 65 IN | WEIGHT: 136.24 LBS

## 2024-05-08 DIAGNOSIS — R31.9 HEMATURIA, UNSPECIFIED TYPE: Primary | ICD-10-CM

## 2024-05-08 LAB
BILIRUB BLD-MCNC: NEGATIVE MG/DL
CLARITY, POC: CLEAR
COLOR UR: YELLOW
EXPIRATION DATE: ABNORMAL
GLUCOSE UR STRIP-MCNC: NEGATIVE MG/DL
KETONES UR QL: ABNORMAL
LEUKOCYTE EST, POC: NEGATIVE
Lab: ABNORMAL
NITRITE UR-MCNC: NEGATIVE MG/ML
PH UR: 7 [PH] (ref 5–8)
PROT UR STRIP-MCNC: NEGATIVE MG/DL
RBC # UR STRIP: ABNORMAL /UL
SP GR UR: 1.01 (ref 1–1.03)
UROBILINOGEN UR QL: ABNORMAL

## 2024-09-12 ENCOUNTER — LAB (OUTPATIENT)
Dept: LAB | Facility: HOSPITAL | Age: 59
End: 2024-09-12
Payer: OTHER GOVERNMENT

## 2024-09-12 ENCOUNTER — OFFICE VISIT (OUTPATIENT)
Dept: ONCOLOGY | Facility: CLINIC | Age: 59
End: 2024-09-12
Payer: OTHER GOVERNMENT

## 2024-09-12 VITALS
SYSTOLIC BLOOD PRESSURE: 106 MMHG | TEMPERATURE: 98.6 F | DIASTOLIC BLOOD PRESSURE: 64 MMHG | OXYGEN SATURATION: 96 % | HEART RATE: 67 BPM | WEIGHT: 131 LBS | BODY MASS INDEX: 21.8 KG/M2

## 2024-09-12 DIAGNOSIS — D05.11 DUCTAL CARCINOMA IN SITU (DCIS) OF RIGHT BREAST: Primary | ICD-10-CM

## 2024-09-12 DIAGNOSIS — D05.11 DUCTAL CARCINOMA IN SITU (DCIS) OF RIGHT BREAST: ICD-10-CM

## 2024-09-12 LAB
ALBUMIN SERPL-MCNC: 3.9 G/DL (ref 3.5–5.2)
ALBUMIN/GLOB SERPL: 1.5 G/DL
ALP SERPL-CCNC: 78 U/L (ref 39–117)
ALT SERPL W P-5'-P-CCNC: 7 U/L (ref 1–33)
ANION GAP SERPL CALCULATED.3IONS-SCNC: 7.3 MMOL/L (ref 5–15)
AST SERPL-CCNC: 19 U/L (ref 1–32)
BASOPHILS # BLD AUTO: 0.06 10*3/MM3 (ref 0–0.2)
BASOPHILS NFR BLD AUTO: 1.1 % (ref 0–1.5)
BILIRUB SERPL-MCNC: 0.3 MG/DL (ref 0–1.2)
BUN SERPL-MCNC: 7 MG/DL (ref 8–23)
BUN/CREAT SERPL: 8.8 (ref 7–25)
CALCIUM SPEC-SCNC: 8.4 MG/DL (ref 8.6–10.5)
CHLORIDE SERPL-SCNC: 101 MMOL/L (ref 98–107)
CO2 SERPL-SCNC: 29.7 MMOL/L (ref 22–29)
CREAT SERPL-MCNC: 0.8 MG/DL (ref 0.57–1)
DEPRECATED RDW RBC AUTO: 46 FL (ref 37–54)
EGFRCR SERPLBLD CKD-EPI 2021: 83.9 ML/MIN/1.73
EOSINOPHIL # BLD AUTO: 0.1 10*3/MM3 (ref 0–0.4)
EOSINOPHIL NFR BLD AUTO: 1.8 % (ref 0.3–6.2)
ERYTHROCYTE [DISTWIDTH] IN BLOOD BY AUTOMATED COUNT: 12.9 % (ref 12.3–15.4)
GLOBULIN UR ELPH-MCNC: 2.6 GM/DL
GLUCOSE SERPL-MCNC: 88 MG/DL (ref 65–99)
HCT VFR BLD AUTO: 37.1 % (ref 34–46.6)
HGB BLD-MCNC: 11.9 G/DL (ref 12–15.9)
IMM GRANULOCYTES # BLD AUTO: 0.01 10*3/MM3 (ref 0–0.05)
IMM GRANULOCYTES NFR BLD AUTO: 0.2 % (ref 0–0.5)
LYMPHOCYTES # BLD AUTO: 2.05 10*3/MM3 (ref 0.7–3.1)
LYMPHOCYTES NFR BLD AUTO: 36.9 % (ref 19.6–45.3)
MCH RBC QN AUTO: 31.2 PG (ref 26.6–33)
MCHC RBC AUTO-ENTMCNC: 32.1 G/DL (ref 31.5–35.7)
MCV RBC AUTO: 97.1 FL (ref 79–97)
MONOCYTES # BLD AUTO: 0.76 10*3/MM3 (ref 0.1–0.9)
MONOCYTES NFR BLD AUTO: 13.7 % (ref 5–12)
NEUTROPHILS NFR BLD AUTO: 2.57 10*3/MM3 (ref 1.7–7)
NEUTROPHILS NFR BLD AUTO: 46.3 % (ref 42.7–76)
NRBC BLD AUTO-RTO: 0 /100 WBC (ref 0–0.2)
PLATELET # BLD AUTO: 246 10*3/MM3 (ref 140–450)
PMV BLD AUTO: 9.7 FL (ref 6–12)
POTASSIUM SERPL-SCNC: 4.3 MMOL/L (ref 3.5–5.2)
PROT SERPL-MCNC: 6.5 G/DL (ref 6–8.5)
RBC # BLD AUTO: 3.82 10*6/MM3 (ref 3.77–5.28)
SODIUM SERPL-SCNC: 138 MMOL/L (ref 136–145)
WBC NRBC COR # BLD AUTO: 5.55 10*3/MM3 (ref 3.4–10.8)

## 2024-09-12 PROCEDURE — 36415 COLL VENOUS BLD VENIPUNCTURE: CPT

## 2024-09-12 PROCEDURE — 80053 COMPREHEN METABOLIC PANEL: CPT

## 2024-09-12 PROCEDURE — 99214 OFFICE O/P EST MOD 30 MIN: CPT | Performed by: INTERNAL MEDICINE

## 2024-09-12 PROCEDURE — 85025 COMPLETE CBC W/AUTO DIFF WBC: CPT

## 2024-09-12 NOTE — PROGRESS NOTES
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    At that time we did an extended 108 gene [employer] panel because of her strong family history of malignancy and this was thankfully benign    We did a bone density which was normal    We then discussed the use of tamoxifen either low-dose or full dose for prevention and when I saw her last she was going to think about this.  Since she is aware when she subsequently transferred her care to the [facility] and in March 2020 she decided to start tamoxifen 20 mg daily    She has been on now 4+ years and has had normal problems with it she has GI problems her IBS is giving her trouble and she is miserable and was hoping to come off of it.  Based on the fact that there is data on 3 years of therapy in this setting I think it is reasonable to stop especially with her tolerance issues and we will follow her    She is due for mammogram again in December    She is up-to-date with Pap smears and colonoscopies and her mammogram is due again in November of this year      Past Medical History:   Diagnosis Date    Anxiety     Asthma     Exercise induced    Breast cancer     RIGHT     Disease of thyroid gland     History of irregular heartbeat     IBS (irritable bowel syndrome)     Lower back pain     PONV (postoperative nausea and vomiting)     Tinnitus         Past Surgical History:   Procedure Laterality Date    BREAST BIOPSY      1992 Left-benign 2019 Right-malignant    BREAST LUMPECTOMY Right 07/24/2019    Procedure: RIGHT BREAST LUMPECTOMY WITH NEEDLE LOCALIZATION;  Surgeon: Ervin Myers MD;   Location: [facility] OR;  Service: General    COLONOSCOPY      ,     COLONOSCOPY N/A 2022    Procedure: COLONOSCOPY WITH BIOPSIES;  Surgeon: Frantz Krishna MD;  Location: [facility] ENDOSCOPY;  Service: Gastroenterology;  Laterality: N/A;  DIVERTICULOSIS, HEMORRHOIDS    ENDOSCOPY N/A 2022    Procedure: ESOPHAGOGASTRODUODENOSCOPY WITH BIOPSIES;  Surgeon: Frantz Krishna MD;  Location: [facility] ENDOSCOPY;  Service: Gastroenterology;  Laterality: N/A;  HIATAL HERNIA    UPPER GASTROINTESTINAL ENDOSCOPY           ONC HISTORY  patient is a 54-year-old female with long history of irritable bowel syndrome who was noted on her routine 3D mammogram this year to have an faint indistinct microcalcifications in the right breast that was not seen a year previously.  This led to a diagnostic mammogram and stereotactic biopsy on 2019 which revealed Intermediate grade DCIS cribriform type with focal necrosis measuring 4mm with a minute intraductal papilloma % MT 12% .  Patient was referred to Dr. Myers and underwent needle localization and lumpectomy on 2019 with the findings of a 1.6 mm area of intermediate grade DCIS.  Margins were close and reexcised in the superior margin there with an additional 0.5 mm area of micropapillary DCIS again with a clear margin although only 1.5 mm.  She has done well postoperatively and is here to discuss any additional treatment options  Patient reports a cyst biopsied in her left breast which was benign in      She is  1 para 1 menarche was at age 11 menopause at 48 she is been on no hormone replacement therapy first childbirth was at age 20 she did not breast-feed  Family history is positive for father diagnosed with prostate cancer at age 60  at 76 of metastatic prostate cancer mother had a brain cancer and  at age 63 she has 1 brother and 2 sisters were healthy she has 2 paternal aunts with breast cancer in her 60s a  paternal aunt with colon cancer in her 60s and 3 paternal uncles with prostate cancer in her 60s for a total of 7 out of 11 siblings on her father's side with malignancy     Genetic testing completely negative 108 gene panel     Current Outpatient Medications on File Prior to Visit   Medication Sig Dispense Refill    cyanocobalamin (VITAMIN B-12) 500 MCG tablet       folic acid (FOLVITE) 1 MG tablet       mirtazapine (REMERON) 15 MG tablet Take 2 tablets by mouth Every Night.  1    tamoxifen (NOLVADEX) 10 MG tablet Take 1 tablet by mouth Daily.      vitamin D (ERGOCALCIFEROL) 1.25 MG (87317 UT) capsule capsule Take 1 capsule by mouth Every 7 (Seven) Days. friday      oxybutynin XL (DITROPAN-XL) 5 MG 24 hr tablet Take 1 tablet by mouth Daily. 30 tablet 11     No current facility-administered medications on file prior to visit.        ALLERGIES:  No Known Allergies     Social History     Socioeconomic History    Marital status:      Spouse name: Edwige    Number of children: 1    Years of education: College   Tobacco Use    Smoking status: Former     Current packs/day: 0.00     Average packs/day: 0.5 packs/day for 30.0 years (15.0 ttl pk-yrs)     Types: Cigarettes     Start date:      Quit date:      Years since quittin.7     Passive exposure: Past    Smokeless tobacco: Never   Vaping Use    Vaping status: Never Used   Substance and Sexual Activity    Alcohol use: No    Drug use: No    Sexual activity: Not Currently     Partners: Male     Birth control/protection: Post-menopausal        Family History   Problem Relation Age of Onset    Prostate cancer Father     Depression Mother     Brain cancer Mother     Cancer Paternal Grandfather         eye    Breast cancer Maternal Aunt     Breast cancer Paternal Aunt     Breast cancer Paternal Aunt     Colon cancer Paternal Aunt     Prostate cancer Paternal Uncle     Prostate cancer Paternal Uncle     Throat cancer Paternal Uncle     Malig Hyperthermia Neg  Hx     Ovarian cancer Neg Hx     Uterine cancer Neg Hx     Pulmonary embolism Neg Hx     Deep vein thrombosis Neg Hx         Review of Systems   Skin:         Brittle nails   Neurological: Negative.    Hematological:  Bruises/bleeds easily.        Objective     Vitals:    09/12/24 1322   BP: 106/64   Pulse: 67   Temp: 98.6 °F (37 °C)   TempSrc: Oral   SpO2: 96%   Weight: 59.4 kg (131 lb)   PainSc: 0-No pain         8/30/2023     2:06 PM   Current Status   ECOG score 0       Physical Exam      CONSTITUTIONAL:  Vital signs reviewed.  No distress, looks comfortable.  EYES:  Conjunctivae and lids unremarkable.  PERRLA  EARS,NOSE,MOUTH,THROAT:  Ears and nose appear unremarkable.  Lips, teeth, gums appear unremarkable.  RESPIRATORY:  Normal respiratory effort.  Lungs clear to auscultation bilaterally.  BREASTS: Both breasts are benign with no palpable masses right lumpectomy scar benign  CARDIOVASCULAR:  Normal S1, S2.  No murmurs rubs or gallops.  No significant lower extremity edema.  GASTROINTESTINAL: Abdomen appears unremarkable.  Nontender.  No hepatomegaly.  No splenomegaly.  LYMPHATIC:  No cervical, supraclavicular, axillary lymphadenopathy.  SKIN:  Warm.  No rashes.  Atypical nevus on her abdomen  PSYCHIATRIC:  Normal judgment and insight.  Normal mood and affect.      RECENT LABS:  Hematology WBC   Date Value Ref Range Status   09/12/2024 5.55 3.40 - 10.80 10*3/mm3 Final     RBC   Date Value Ref Range Status   09/12/2024 3.82 3.77 - 5.28 10*6/mm3 Final     Hemoglobin   Date Value Ref Range Status   09/12/2024 11.9 (L) 12.0 - 15.9 g/dL Final     Hematocrit   Date Value Ref Range Status   09/12/2024 37.1 34.0 - 46.6 % Final     Platelets   Date Value Ref Range Status   09/12/2024 246 140 - 450 10*3/mm3 Final          Assessment & Plan     1. Intermediate grade DCIS  ER ID positive postlumpectomy and radiation  10 mg tamoxifen initiated in March 2020  Okayed to stop tamoxifen as of 9/24 due to poor tolerance and  adequate treatment    2.  Chronic irritable bowel syndrome    3 positive paternal family history of.  Multiple malignancies-extended genetic testing negative 108 genes    4.  Normal bone density 2019    Plan  1.  Stop tamoxifen 10 mg daily  2.  Mammogram in November 24  3.  Continue with annual Pap smears  4.  See me in 1 year for follow-up

## 2024-12-30 ENCOUNTER — HOSPITAL ENCOUNTER (OUTPATIENT)
Dept: MAMMOGRAPHY | Facility: HOSPITAL | Age: 59
Discharge: HOME | End: 2024-12-30
Admitting: INTERNAL MEDICINE
Payer: OTHER GOVERNMENT

## 2024-12-30 DIAGNOSIS — D05.11 DUCTAL CARCINOMA IN SITU (DCIS) OF RIGHT BREAST: ICD-10-CM

## 2024-12-30 PROCEDURE — 77063 BREAST TOMOSYNTHESIS BI: CPT

## 2024-12-30 PROCEDURE — 77067 SCR MAMMO BI INCL CAD: CPT

## 2025-01-07 NOTE — PROGRESS NOTES
"Well Woman Visit    CC: Scheduled annual well gyn visit  Chief Complaint   Patient presents with    Gynecologic Exam       HPI:   59 y.o.   Social History     Substance and Sexual Activity   Sexual Activity Not Currently    Partners: Male    Birth control/protection: Post-menopausal     Mammo Screening Digital Tomosynthesis Bilateral With CAD (2024 17:54)  SCANNED PATHOLOGY (2019) Pap only negative  COLONOSCOPY (2022 13:41) recs 5yrs  Never had DEXA- lactose intolerant, low lifetime Ca2+ intake    Progress Notes by Shy Durant DO (2024 09:30)    Breast cancer survivor, s/p tamoxifen x4-5yrs    .  No VB.  No pelvic pain.  No HRT     Pt has no complaints today.    PCP: does manage PMHx and preventative labs  History: PMHx, Meds, Allergies, PSHx, Social Hx, and POBHx all reviewed and updated.    PHYSICAL EXAM:  /75   Pulse 80   Ht 165.1 cm (65\")   Wt 57.2 kg (126 lb)   BMI 20.97 kg/m²  Not found.   Chaperone present during breast and/or pelvic exam if performed.  General- NAD, alert and oriented, appropriate  Psych- Normal mood, good memory  Neck- No masses, no thyroid enlargement  CV- Regular rhythm, no murmurs  Resp- CTA to bases, no wheezes  Abdomen- Soft, non distended, non tender, no masses    Breast left-  Bilaterally symmetrical, no masses, non tender, no nipple discharge, no axillary or supraclavicular nodes palpable.    Breast right- Bilaterally symmetrical, no masses, non tender, no nipple discharge, no axillary or supraclavicular nodes palpable.      External genitalia- Normal female, no lesions  Urethra/meatus- Normal, no masses, non tender  Bladder- Normal, no masses, non tender  Vagina- Normal, mild atrophy, no lesions, no discharge.    Prolapse : none noted   Cvx- Normal, no lesions, no discharge, No cervical motion tenderness  Uterus- Normal size, shape & consistency.  Non tender, mobile.  Adnexa- No mass, non tender  Anus/Rectum/Perineum- Normal appearance, " no mass, good sphincter tone, WITH small hemorrhoids, no prolapse    Lymphatic- No palpable neck, axillary, or groin nodes  Ext- No edema, no cyanosis    Skin- No lesions, no rashes, no acanthosis nigricans      ASSESSMENT and PLAN:    Diagnoses and all orders for this visit:    1. Well woman exam (Primary)  -     IgP, Aptima HPV; Future  -     Mammo Screening Digital Tomosynthesis Bilateral With CAD; Future  -     IgP, Aptima HPV    2. Menopause  -     DEXA Bone Density Axial; Future        Preventative:  BREAST HEALTH- Monthly self breast exam importance and how to reviewed. MMG and/or MRI (prn) reviewed per society guidelines and her individual history. Screen: Updated today  CERVICAL CANCER Screening- Reviewed current ASCCP guidelines for screening w and wo cotest HPV, age specific.  Screen: Updated today  COLON CANCER Screening- Reviewed current medical society guidelines and options.  Screen:  Already up to date  Importance of WEIGHT MANAGEMENT, nutrition, and exercise reviewed.  Ideal BMI discussed  BONE HEALTH- Reviewed current medical society guidelines and options for testing, calcium and vit D intake.  Weight bearing exercise.  DEXA: Updated today  VACCINATIONS Recommended: Covid vaccine, Flu annually, Tdap [id number], Zoster (51yo and older), RSV vaccine (61yo and older).  Importance discussed, risk being unvaccinated reviewed.  Questions answered  Smoking status- NON SMOKER/VAPER  Follow up PCP/Specialist PMHx and/or Labs          She understands the importance of having any ordered tests to be performed in a timely fashion.  The risks of not performing them include, but are not limited to, advanced cancer stages, bone loss from osteoporosis and/or subsequent increase in morbidity and/or mortality.  She is encouraged to review her results online and/or contact or office if she has questions.     Follow Up:  Return in about 1 year (around 1/8/2026) for WWE.            Shy Durant, DO  01/08/2025    [facility]  [facility]  [address] [place] [address]  Dept: [phone]  Dept Fax: [phone]  Loc: [phone]  Loc Fax: [phone]

## 2025-01-08 ENCOUNTER — OFFICE VISIT (OUTPATIENT)
Dept: OBSTETRICS AND GYNECOLOGY | Facility: CLINIC | Age: 60
End: 2025-01-08
Payer: OTHER GOVERNMENT

## 2025-01-08 VITALS
WEIGHT: 126 LBS | DIASTOLIC BLOOD PRESSURE: 75 MMHG | BODY MASS INDEX: 20.99 KG/M2 | HEART RATE: 80 BPM | HEIGHT: 65 IN | SYSTOLIC BLOOD PRESSURE: 131 MMHG

## 2025-01-08 DIAGNOSIS — Z78.0 MENOPAUSE: ICD-10-CM

## 2025-01-08 DIAGNOSIS — Z01.419 WELL WOMAN EXAM: Primary | ICD-10-CM

## 2025-01-08 PROCEDURE — 99396 PREV VISIT EST AGE 40-64: CPT | Performed by: OBSTETRICS & GYNECOLOGY

## 2025-01-08 PROCEDURE — G0123 SCREEN CERV/VAG THIN LAYER: HCPCS | Performed by: OBSTETRICS & GYNECOLOGY

## 2025-01-08 PROCEDURE — 87624 HPV HI-RISK TYP POOLED RSLT: CPT | Performed by: OBSTETRICS & GYNECOLOGY

## 2025-01-10 LAB
CYTOLOGIST CVX/VAG CYTO: NORMAL
CYTOLOGY CVX/VAG DOC CYTO: NORMAL
CYTOLOGY CVX/VAG DOC THIN PREP: NORMAL
DX ICD CODE: NORMAL
HPV I/H RISK 4 DNA CVX QL PROBE+SIG AMP: NEGATIVE
Lab: NORMAL
OTHER STN SPEC: NORMAL
STAT OF ADQ CVX/VAG CYTO-IMP: NORMAL

## (undated) DEVICE — DRSNG SURESITE WNDW 4X4.5

## (undated) DEVICE — TUBING, SUCTION, 1/4" X 20', STRAIGHT: Brand: MEDLINE INDUSTRIES, INC.

## (undated) DEVICE — SOL IRRG H2O PL/BG 1000ML STRL

## (undated) DEVICE — 3M™ STERI-STRIP™ REINFORCED ADHESIVE SKIN CLOSURES, R1547, 1/2 IN X 4 IN (12 MM X 100 MM), 6 STRIPS/ENVELOPE: Brand: 3M™ STERI-STRIP™

## (undated) DEVICE — Device: Brand: FABCO

## (undated) DEVICE — BLCK/BITE BLOX WO/DENTL/RIM W/STRAP 54F

## (undated) DEVICE — ANTIBACTERIAL UNDYED BRAIDED (POLYGLACTIN 910), SYNTHETIC ABSORBABLE SUTURE: Brand: COATED VICRYL

## (undated) DEVICE — 3M™ STERI-STRIP™ COMPOUND BENZOIN TINCTURE 40 BAGS/CARTON 4 CARTONS/CASE C1544: Brand: 3M™ STERI-STRIP™

## (undated) DEVICE — SINGLE-USE BIOPSY FORCEPS: Brand: RADIAL JAW 4

## (undated) DEVICE — ELECTRD BLD EDGE/INSUL1P 2.4X5.1MM STRL

## (undated) DEVICE — Device: Brand: DEFENDO AIR/WATER/SUCTION AND BIOPSY VALVE

## (undated) DEVICE — CONN JET HYDRA H20 AUXILIARY DISP

## (undated) DEVICE — PK PROC MAJ 40

## (undated) DEVICE — LINER SURG CANSTR SXN S/RIGD 1500CC

## (undated) DEVICE — SOLIDIFIER LIQLOC PLS 1500CC BT

## (undated) DEVICE — SKIN PREP TRAY W/CHG: Brand: MEDLINE INDUSTRIES, INC.

## (undated) DEVICE — Device

## (undated) DEVICE — INTENDED FOR TISSUE SEPARATION, AND OTHER PROCEDURES THAT REQUIRE A SHARP SURGICAL BLADE TO PUNCTURE OR CUT.: Brand: BARD-PARKER ® CARBON RIB-BACK BLADES

## (undated) DEVICE — SUT SILK 2/0 FS BLK 18IN 685G

## (undated) DEVICE — PENCL E/S HNDSWTCH SMOKEEVAC HOLSTR 10FT

## (undated) DEVICE — NDL HYPO ECLPS SFTY 22G 1 1/2IN

## (undated) DEVICE — GLV SURG PREMIERPRO ORTHO LTX PF SZ8 BRN